# Patient Record
Sex: MALE | Race: WHITE | URBAN - METROPOLITAN AREA
[De-identification: names, ages, dates, MRNs, and addresses within clinical notes are randomized per-mention and may not be internally consistent; named-entity substitution may affect disease eponyms.]

---

## 2017-03-15 ENCOUNTER — OFFICE VISIT (OUTPATIENT)
Dept: FAMILY MEDICINE CLINIC | Age: 32
End: 2017-03-15

## 2017-03-15 VITALS
TEMPERATURE: 98.2 F | BODY MASS INDEX: 43.93 KG/M2 | WEIGHT: 315 LBS | DIASTOLIC BLOOD PRESSURE: 59 MMHG | OXYGEN SATURATION: 97 % | SYSTOLIC BLOOD PRESSURE: 114 MMHG | RESPIRATION RATE: 18 BRPM

## 2017-03-15 DIAGNOSIS — K92.1 BLOOD IN STOOL: Primary | ICD-10-CM

## 2017-03-15 DIAGNOSIS — R10.31 ABDOMINAL PAIN, RIGHT LOWER QUADRANT: ICD-10-CM

## 2017-03-15 DIAGNOSIS — R05.9 COUGH: ICD-10-CM

## 2017-03-15 DIAGNOSIS — R10.9 ABDOMINAL CRAMPING: ICD-10-CM

## 2017-03-15 RX ORDER — CIPROFLOXACIN 500 MG/1
500 TABLET ORAL 2 TIMES DAILY
Qty: 20 TAB | Refills: 0 | Status: SHIPPED | OUTPATIENT
Start: 2017-03-15 | End: 2017-03-25

## 2017-03-15 RX ORDER — METRONIDAZOLE 500 MG/1
500 TABLET ORAL 3 TIMES DAILY
Qty: 21 TAB | Refills: 0 | Status: SHIPPED | OUTPATIENT
Start: 2017-03-15 | End: 2017-03-22

## 2017-03-15 NOTE — PROGRESS NOTES
Chief Complaint   Patient presents with    Cold Symptoms     symptoms for past two weeks    Abdominal Pain     blood in stool today bright red     Ana Mac

## 2017-03-15 NOTE — MR AVS SNAPSHOT
Visit Information Date & Time Provider Department Dept. Phone Encounter #  
 3/15/2017  1:00 PM José Miguel Nobles, [facility] [place] [address] [phone] [id number] Follow-up Instructions Return if symptoms worsen or fail to improve. Upcoming Health Maintenance Date Due DTaP/Tdap/Td series (2 - Td) 12/12/2026 Allergies as of 3/15/2017  Review Complete On: 3/15/2017 By: José Miguel Nobles NP No Known Allergies Current Immunizations  Reviewed on 12/12/2016 Name Date Influenza Nasal Vaccine 12/19/2012 Influenza Vaccine (Quad) PF 10/17/2016 Tdap 12/12/2016 Not reviewed this visit You Were Diagnosed With   
  
 Codes Comments Blood in stool    -  Primary ICD-10-CM: K92.1 ICD-9-CM: 578.1 Cough     ICD-10-CM: R05 ICD-9-CM: 786.2 Abdominal cramping     ICD-10-CM: R10.9 ICD-9-CM: 789.00 Abdominal pain, right lower quadrant     ICD-10-CM: R10.31 ICD-9-CM: 789.03 Vitals BP Temp Resp Weight(growth percentile) SpO2 BMI  
 114/59 98.2 °F (36.8 °C) 18 315 lb (142.9 kg) 97% 43.93 kg/m2 Smoking Status Never Smoker BMI and BSA Data Body Mass Index Body Surface Area  
 43.93 kg/m 2 2.68 m 2 Preferred Pharmacy Pharmacy Name Phone Addison Turner [facility] [phone] Your Updated Medication List  
  
   
This list is accurate as of: 3/15/17  2:21 PM.  Always use your most recent med list.  
  
  
  
  
 albuterol 90 mcg/actuation inhaler Commonly known as:  PROVENTIL HFA, VENTOLIN HFA, PROAIR HFA Take 2 puffs by inhalation every four (4) hours as needed for Wheezing. ciprofloxacin HCl 500 mg tablet Commonly known as:  CIPRO Take 1 Tab by mouth two (2) times a day for 10 days. lisinopril-hydroCHLOROthiazide 10-12.5 mg per tablet Commonly known as:  Vinicio Posey Take 1 Tab by mouth daily. metroNIDAZOLE 500 mg tablet Commonly known as:  FLAGYL Take 1 Tab by mouth three (3) times daily for 7 days. Prescriptions Sent to Pharmacy Refills  
 ciprofloxacin HCl (CIPRO) 500 mg tablet 0 Sig: Take 1 Tab by mouth two (2) times a day for 10 days. Class: Normal  
 Pharmacy: [address] [facility] Ph #: [phone] Route: Oral  
 metroNIDAZOLE (FLAGYL) 500 mg tablet 0 Sig: Take 1 Tab by mouth three (3) times daily for 7 days. Class: Normal  
 Pharmacy: [address] [facility] Ph #: [phone] Route: Oral  
  
We Performed the Following CBC WITH AUTOMATED DIFF [32405 CPT(R)] METABOLIC PANEL, COMPREHENSIVE [79547 CPT(R)] REFERRAL TO GASTROENTEROLOGY [PUV28 Custom] Comments:  
 Please evaluate patient for abdominal pain/ + blood in stool. Follow-up Instructions Return if symptoms worsen or fail to improve. Referral Information Referral ID Referred By Referred To  
  
 [id number] [facility] [address] [place] [address] Visits Status Start Date End Date 1 New Request 3/15/17 3/15/18 If your referral has a status of pending review or denied, additional information will be sent to support the outcome of this decision. Patient Instructions Lower Gastrointestinal Bleeding: Care Instructions Your Care Instructions The digestive or gastrointestinal tract goes from the mouth to the anus. It is often called the GI tract. Bleeding in the lower GI tract can happen anywhere in your small or large intestine. It can also happen in your rectum or anus. In some cases, it is caused by an infection, cancer, or inflammatory bowel disease. Or it may be caused by hemorrhoids, diverticulitis, or clotting problems. Light bleeding may not cause any symptoms at first. But if you continue to bleed for a while, you may feel very weak or tired. Sudden, heavy bleeding means you need to see a doctor right away. This kind of bleeding can be very dangerous. But it can usually be cured or controlled. The doctor may do some tests to find the cause of your bleeding. Follow-up care is a key part of your treatment and safety. Be sure to make and go to all appointments, and call your doctor if you are having problems. It's also a good idea to know your test results and keep a list of the medicines you take. How can you care for yourself at home? · Be safe with medicines. Take your medicines exactly as prescribed. Call your doctor if you think you are having a problem with your medicine. You will get more details on the specific medicines your doctor prescribes. · Do not take aspirin or other anti-inflammatory medicines, such as naproxen (Aleve) or ibuprofen (Advil, Motrin), without talking to your doctor first. Ask your doctor if it is okay to use acetaminophen (Tylenol). · Do not drink alcohol. · The bleeding may make you lose iron. So it's important to eat foods that have a lot of iron. These include red meat, shellfish, poultry, and eggs. They also include beans, raisins, whole-grain breads, and leafy green vegetables. If you want help planning meals, you can meet with a dietitian. When should you call for help? Call 911 anytime you think you may need emergency care. For example, call if: 
· You have sudden, severe belly pain. · You vomit blood or what looks like coffee grounds. · You passed out (lost consciousness). · Your stools are maroon or very bloody. Call your doctor now or seek immediate medical care if: 
· You are dizzy or lightheaded, or you feel like you may faint. · Your stools are black and look like tar, or they have streaks of blood. · You have belly pain. · You vomit or have nausea. Watch closely for changes in your health, and be sure to contact your doctor if you do not get better as expected. Where can you learn more? Go to http://summer-lita.info/. Enter V930 in the search box to learn more about \"Lower Gastrointestinal Bleeding: Care Instructions. \" Current as of: May 27, 2016 Content Version: 11.1 © 2561-1809 Raiseworks. Care instructions adapted under license by Attend[employer] (which disclaims liability or warranty for this information). If you have questions about a medical condition or this instruction, always ask your healthcare professional. [place] [address] any warranty or liability for your use of this information. Abdominal Pain: Care Instructions Your Care Instructions Abdominal pain has many possible causes. Some aren't serious and get better on their own in a few days. Others need more testing and treatment. If your pain continues or gets worse, you need to be rechecked and may need more tests to find out what is wrong. You may need surgery to correct the problem. Don't ignore new symptoms, such as fever, nausea and vomiting, urination problems, pain that gets worse, and dizziness. These may be signs of a more serious problem. Your doctor may have recommended a follow-up visit in the next 8 to 12 hours. If you are not getting better, you may need more tests or treatment. The doctor has checked you carefully, but problems can develop later. If you notice any problems or new symptoms, get medical treatment right away. Follow-up care is a key part of your treatment and safety. Be sure to make and go to all appointments, and call your doctor if you are having problems. It's also a good idea to know your test results and keep a list of the medicines you take. How can you care for yourself at home? · Rest until you feel better.  
· To prevent dehydration, drink plenty of fluids, enough so that your urine is light yellow or clear like water. Choose water and other caffeine-free clear liquids until you feel better. If you have kidney, heart, or liver disease and have to limit fluids, talk with your doctor before you increase the amount of fluids you drink. · If your stomach is upset, eat mild foods, such as rice, dry toast or crackers, bananas, and applesauce. Try eating several small meals instead of two or three large ones. · Wait until 48 hours after all symptoms have gone away before you have spicy foods, alcohol, and drinks that contain caffeine. · Do not eat foods that are high in fat. · Avoid anti-inflammatory medicines such as aspirin, ibuprofen (Advil, Motrin), and naproxen (Aleve). These can cause stomach upset. Talk to your doctor if you take daily aspirin for another health problem. When should you call for help? Call 911 anytime you think you may need emergency care. For example, call if: 
· You passed out (lost consciousness). · You pass maroon or very bloody stools. · You vomit blood or what looks like coffee grounds. · You have new, severe belly pain. Call your doctor now or seek immediate medical care if: 
· Your pain gets worse, especially if it becomes focused in one area of your belly. · You have a new or higher fever. · Your stools are black and look like tar, or they have streaks of blood. · You have unexpected vaginal bleeding. · You have symptoms of a urinary tract infection. These may include: 
¨ Pain when you urinate. ¨ Urinating more often than usual. 
¨ Blood in your urine. · You are dizzy or lightheaded, or you feel like you may faint. Watch closely for changes in your health, and be sure to contact your doctor if: 
· You are not getting better after 1 day (24 hours). Where can you learn more? Go to http://summer-lita.info/. Enter U670 in the search box to learn more about \"Abdominal Pain: Care Instructions. \" Current as of: May 27, 2016 Content Version: 11.1 © 8854-3632 [employer]. Care instructions adapted under license by Invite[employer] (which disclaims liability or warranty for this information). If you have questions about a medical condition or this instruction, always ask your healthcare professional. [address] any warranty or liability for your use of this information. Possible Appendicitis: Care Instructions Your Care Instructions Your doctor thinks you may have appendicitis. This means that your appendix may be infected. The appendix is a small sac that is shaped like a finger. It's attached to your large intestine. Sometimes it's hard to tell if a person has appendicitis. It is especially hard to tell in children, pregnant women, and older adults. If your doctor thinks it's possible that you have appendicitis, he or she may want to order more tests. Or your doctor may want to wait to see if your symptoms change. Your doctor thinks it's okay for you to go home right now. But you will need to watch for symptoms of appendicitis at home. If your symptoms continue or get worse, it's important to call your doctor or get medical care right away. Appendicitis can get serious very quickly. The main treatment is surgery to remove your appendix. Follow-up care is a key part of your treatment and safety. Be sure to make and go to all appointments, and call your doctor if you are having problems. It's also a good idea to know your test results and keep a list of the medicines you take. How can you care for yourself at home? · Do not eat or drink, unless your doctor says it is okay. If you need surgery, it's best to have an empty stomach. If you're thirsty, you can rinse your mouth with water. Or you can suck on hard candy. · Do not take laxatives. If you have appendicitis, they can make the appendix burst. 
· Follow your doctor's instructions about taking medicines.  Your doctor may tell you not to take antibiotics or pain pills. These medicines can make it harder to tell if you have appendicitis. · Watch for symptoms of appendicitis. See the When should you call for help section below. It is very important to follow your doctor's instructions about getting treatment if you have these symptoms. When should you call for help? Call 911 anytime you think you may need emergency care. For example, call if: 
· You passed out (lost consciousness). · You have new, severe belly pain and feel weak. Call your doctor now or seek immediate medical care if: 
· You have belly pain below your belly button on the right side of your belly. · You have belly pain that gets worse when you move, walk, or cough. · Your belly pain does not get better after a few days. · You have a fever over 100°F. 
· You are sick to your stomach or can't keep fluids down. · You have trouble passing gas or stools. · Your belly is bloated or swollen. Watch closely for changes in your health, and be sure to contact your doctor if: 
· You do not get better as expected. Where can you learn more? Go to http://summer-lita.info/. Enter I354 in the search box to learn more about \"Possible Appendicitis: Care Instructions. \" Current as of: August 9, 2016 Content Version: 11.1 © 3253-9740 GemShare. Care instructions adapted under license by Philo (which disclaims liability or warranty for this information). If you have questions about a medical condition or this instruction, always ask your healthcare professional. [place] [address] any warranty or liability for your use of this information. Diverticulitis: Care Instructions Your Care Instructions Diverticulitis occurs when pouches form in the wall of the colon and become inflamed or infected. It can be very painful. Doctors aren't sure what causes diverticulitis.  There is no proof that foods such as nuts, seeds, or berries cause it or make it worse. A low-fiber diet may cause the colon to work harder to push stool forward. Pouches may form because of this extra work. It may be hard to think about healthy eating while you're in pain. But as you recover, you might think about how you can use healthy eating for overall better health. Healthy eating may help you avoid future attacks. Follow-up care is a key part of your treatment and safety. Be sure to make and go to all appointments, and call your doctor if you are having problems. It's also a good idea to know your test results and keep a list of the medicines you take. How can you care for yourself at home? · Drink plenty of fluids, enough so that your urine is light yellow or clear like water. If you have kidney, heart, or liver disease and have to limit fluids, talk with your doctor before you increase the amount of fluids you drink. · Stick to liquids or a bland diet (plain rice, bananas, dry toast or crackers, applesauce) until you are feeling better. Then you can return to regular foods and gradually increase the amount of fiber in your diet. · Use a heating pad set on low on your belly to relieve mild cramps and pain. · Get extra rest until you are feeling better. · Be safe with medicines. Read and follow all instructions on the label. ¨ If the doctor gave you a prescription medicine for pain, take it as prescribed. ¨ If you are not taking a prescription pain medicine, ask your doctor if you can take an over-the-counter medicine. · If your doctor prescribed antibiotics, take them as directed. Do not stop taking them just because you feel better. You need to take the full course of antibiotics. To prevent future attacks of diverticulitis · Avoid constipation: 
¨ Include fruits, vegetables, beans, and whole grains in your diet each day. These foods are high in fiber. ¨ Drink plenty of fluids, enough so that your urine is light yellow or clear like water. If you have kidney, heart, or liver disease and have to limit fluids, talk with your doctor before you increase the amount of fluids you drink. ¨ Get some exercise every day. Build up slowly to 30 to 60 minutes a day on 5 or more days of the week. ¨ Take a fiber supplement, such as Citrucel or Metamucil, every day if needed. Read and follow all instructions on the label. ¨ Schedule time each day for a bowel movement. Having a daily routine may help. Take your time and do not strain when having a bowel movement. When should you call for help? Call 911 anytime you think you may need emergency care. For example, call if: 
· You passed out (lost consciousness). · You vomit blood or what looks like coffee grounds. · You pass maroon or very bloody stools. Call your doctor now or seek immediate medical care if: 
· You have severe pain or swelling in your belly. · You have a new or higher fever. · You cannot keep down fluids or medicines. · You have new pain that gets worse when you move or cough. Watch closely for changes in your health, and be sure to contact your doctor if: · The symptoms you had when you first started feeling sick come back. · You do not get better as expected. Where can you learn more? Go to http://summer-lita.info/. Enter H901 in the search box to learn more about \"Diverticulitis: Care Instructions. \" Current as of: August 9, 2016 Content Version: 11.1 © 6982-5364 Healthwise, Incorporated. Care instructions adapted under license by AssuraMed (which disclaims liability or warranty for this information). If you have questions about a medical condition or this instruction, always ask your healthcare professional. [place] [address] any warranty or liability for your use of this information. Introducing [facility]! [employer] introduces Healcerion patient portal. Now you can access parts of your medical record, email your doctor's office, and request medication refills online. 1. In your internet browser, go to [email or web address] 2. Click on the First Time User? Click Here link in the Sign In box. You will see the New Member Sign Up page. 3. Enter your Healcerion Access Code exactly as it appears below. You will not need to use this code after youve completed the sign-up process. If you do not sign up before the expiration date, you must request a new code. · Healcerion Access Code: [email or web address] Expires: 6/13/2017  1:19 PM 
 
4. Enter the last four digits of your Social Security Number (xxxx) and Date of Birth (mm/dd/yyyy) as indicated and click Submit. You will be taken to the next sign-up page. 5. Create a Healcerion ID. This will be your Healcerion login ID and cannot be changed, so think of one that is secure and easy to remember. 6. Create a Healcerion password. You can change your password at any time. 7. Enter your Password Reset Question and Answer. This can be used at a later time if you forget your password. 8. Enter your e-mail address. You will receive e-mail notification when new information is available in [address] 9. Click Sign Up. You can now view and download portions of your medical record. 10. Click the Download Summary menu link to download a portable copy of your medical information. If you have questions, please visit the Frequently Asked Questions section of the Healcerion website. Remember, Healcerion is NOT to be used for urgent needs. For medical emergencies, dial 911. Now available from your iPhone and Android! Please provide this summary of care documentation to your next provider. Your primary care clinician is listed as MARY HERNANDEZ. If you have any questions after today's visit, please call [phone].

## 2017-03-15 NOTE — LETTER
NOTIFICATION RETURN TO WORK / SCHOOL 
 
3/15/2017 2:20 PM 
 
Mr. Mateo Ibrahim [address] [place] [id number] To Whom It May Concern: 
 
Mateo Ibrahim is currently under the care of [address] [place] [address]. He will return to work/school on: Monday 20, 2017 If there are questions or concerns please have the patient contact our office. Sincerely, Erin Norris NP

## 2017-03-15 NOTE — PATIENT INSTRUCTIONS
Lower Gastrointestinal Bleeding: Care Instructions  Your Care Instructions    The digestive or gastrointestinal tract goes from the mouth to the anus. It is often called the GI tract. Bleeding in the lower GI tract can happen anywhere in your small or large intestine. It can also happen in your rectum or anus. In some cases, it is caused by an infection, cancer, or inflammatory bowel disease. Or it may be caused by hemorrhoids, diverticulitis, or clotting problems. Light bleeding may not cause any symptoms at first. But if you continue to bleed for a while, you may feel very weak or tired. Sudden, heavy bleeding means you need to see a doctor right away. This kind of bleeding can be very dangerous. But it can usually be cured or controlled. The doctor may do some tests to find the cause of your bleeding. Follow-up care is a key part of your treatment and safety. Be sure to make and go to all appointments, and call your doctor if you are having problems. It's also a good idea to know your test results and keep a list of the medicines you take. How can you care for yourself at home? · Be safe with medicines. Take your medicines exactly as prescribed. Call your doctor if you think you are having a problem with your medicine. You will get more details on the specific medicines your doctor prescribes. · Do not take aspirin or other anti-inflammatory medicines, such as naproxen (Aleve) or ibuprofen (Advil, Motrin), without talking to your doctor first. Ask your doctor if it is okay to use acetaminophen (Tylenol). · Do not drink alcohol. · The bleeding may make you lose iron. So it's important to eat foods that have a lot of iron. These include red meat, shellfish, poultry, and eggs. They also include beans, raisins, whole-grain breads, and leafy green vegetables. If you want help planning meals, you can meet with a dietitian. When should you call for help?   Call 911 anytime you think you may need emergency care. For example, call if:  · You have sudden, severe belly pain. · You vomit blood or what looks like coffee grounds. · You passed out (lost consciousness). · Your stools are maroon or very bloody. Call your doctor now or seek immediate medical care if:  · You are dizzy or lightheaded, or you feel like you may faint. · Your stools are black and look like tar, or they have streaks of blood. · You have belly pain. · You vomit or have nausea. Watch closely for changes in your health, and be sure to contact your doctor if you do not get better as expected. Where can you learn more? Go to http://summer-lita.info/. Enter C504 in the search box to learn more about \"Lower Gastrointestinal Bleeding: Care Instructions. \"  Current as of: May 27, 2016  Content Version: 11.1  © 9598-0712 [employer]. Care instructions adapted under license by [employer] (which disclaims liability or warranty for this information). If you have questions about a medical condition or this instruction, always ask your healthcare professional. [place] [address] any warranty or liability for your use of this information. Abdominal Pain: Care Instructions  Your Care Instructions    Abdominal pain has many possible causes. Some aren't serious and get better on their own in a few days. Others need more testing and treatment. If your pain continues or gets worse, you need to be rechecked and may need more tests to find out what is wrong. You may need surgery to correct the problem. Don't ignore new symptoms, such as fever, nausea and vomiting, urination problems, pain that gets worse, and dizziness. These may be signs of a more serious problem. Your doctor may have recommended a follow-up visit in the next 8 to 12 hours. If you are not getting better, you may need more tests or treatment. The doctor has checked you carefully, but problems can develop later.  If you notice any problems or new symptoms, get medical treatment right away. Follow-up care is a key part of your treatment and safety. Be sure to make and go to all appointments, and call your doctor if you are having problems. It's also a good idea to know your test results and keep a list of the medicines you take. How can you care for yourself at home? · Rest until you feel better. · To prevent dehydration, drink plenty of fluids, enough so that your urine is light yellow or clear like water. Choose water and other caffeine-free clear liquids until you feel better. If you have kidney, heart, or liver disease and have to limit fluids, talk with your doctor before you increase the amount of fluids you drink. · If your stomach is upset, eat mild foods, such as rice, dry toast or crackers, bananas, and applesauce. Try eating several small meals instead of two or three large ones. · Wait until 48 hours after all symptoms have gone away before you have spicy foods, alcohol, and drinks that contain caffeine. · Do not eat foods that are high in fat. · Avoid anti-inflammatory medicines such as aspirin, ibuprofen (Advil, Motrin), and naproxen (Aleve). These can cause stomach upset. Talk to your doctor if you take daily aspirin for another health problem. When should you call for help? Call 911 anytime you think you may need emergency care. For example, call if:  · You passed out (lost consciousness). · You pass maroon or very bloody stools. · You vomit blood or what looks like coffee grounds. · You have new, severe belly pain. Call your doctor now or seek immediate medical care if:  · Your pain gets worse, especially if it becomes focused in one area of your belly. · You have a new or higher fever. · Your stools are black and look like tar, or they have streaks of blood. · You have unexpected vaginal bleeding. · You have symptoms of a urinary tract infection. These may include:  ¨ Pain when you urinate.   ¨ Urinating more often than usual.  ¨ Blood in your urine. · You are dizzy or lightheaded, or you feel like you may faint. Watch closely for changes in your health, and be sure to contact your doctor if:  · You are not getting better after 1 day (24 hours). Where can you learn more? Go to http://summer-lita.info/. Enter X789 in the search box to learn more about \"Abdominal Pain: Care Instructions. \"  Current as of: May 27, 2016  Content Version: 11.1  © 3308-4516 [employer]. Care instructions adapted under license by [employer] (which disclaims liability or warranty for this information). If you have questions about a medical condition or this instruction, always ask your healthcare professional. [address] any warranty or liability for your use of this information. Possible Appendicitis: Care Instructions  Your Care Instructions    Your doctor thinks you may have appendicitis. This means that your appendix may be infected. The appendix is a small sac that is shaped like a finger. It's attached to your large intestine. Sometimes it's hard to tell if a person has appendicitis. It is especially hard to tell in children, pregnant women, and older adults. If your doctor thinks it's possible that you have appendicitis, he or she may want to order more tests. Or your doctor may want to wait to see if your symptoms change. Your doctor thinks it's okay for you to go home right now. But you will need to watch for symptoms of appendicitis at home. If your symptoms continue or get worse, it's important to call your doctor or get medical care right away. Appendicitis can get serious very quickly. The main treatment is surgery to remove your appendix. Follow-up care is a key part of your treatment and safety. Be sure to make and go to all appointments, and call your doctor if you are having problems.  It's also a good idea to know your test results and keep a list of the medicines you take. How can you care for yourself at home? · Do not eat or drink, unless your doctor says it is okay. If you need surgery, it's best to have an empty stomach. If you're thirsty, you can rinse your mouth with water. Or you can suck on hard candy. · Do not take laxatives. If you have appendicitis, they can make the appendix burst.  · Follow your doctor's instructions about taking medicines. Your doctor may tell you not to take antibiotics or pain pills. These medicines can make it harder to tell if you have appendicitis. · Watch for symptoms of appendicitis. See the When should you call for help section below. It is very important to follow your doctor's instructions about getting treatment if you have these symptoms. When should you call for help? Call 911 anytime you think you may need emergency care. For example, call if:  · You passed out (lost consciousness). · You have new, severe belly pain and feel weak. Call your doctor now or seek immediate medical care if:  · You have belly pain below your belly button on the right side of your belly. · You have belly pain that gets worse when you move, walk, or cough. · Your belly pain does not get better after a few days. · You have a fever over 100°F.  · You are sick to your stomach or can't keep fluids down. · You have trouble passing gas or stools. · Your belly is bloated or swollen. Watch closely for changes in your health, and be sure to contact your doctor if:  · You do not get better as expected. Where can you learn more? Go to http://summer-lita.info/. Enter Q289 in the search box to learn more about \"Possible Appendicitis: Care Instructions. \"  Current as of: August 9, 2016  Content Version: 11.1  © 0305-0851 [employer]. Care instructions adapted under license by [employer] (which disclaims liability or warranty for this information).  If you have questions about a medical condition or this instruction, always ask your healthcare professional. [address] any warranty or liability for your use of this information. Diverticulitis: Care Instructions  Your Care Instructions    Diverticulitis occurs when pouches form in the wall of the colon and become inflamed or infected. It can be very painful. Doctors aren't sure what causes diverticulitis. There is no proof that foods such as nuts, seeds, or berries cause it or make it worse. A low-fiber diet may cause the colon to work harder to push stool forward. Pouches may form because of this extra work. It may be hard to think about healthy eating while you're in pain. But as you recover, you might think about how you can use healthy eating for overall better health. Healthy eating may help you avoid future attacks. Follow-up care is a key part of your treatment and safety. Be sure to make and go to all appointments, and call your doctor if you are having problems. It's also a good idea to know your test results and keep a list of the medicines you take. How can you care for yourself at home? · Drink plenty of fluids, enough so that your urine is light yellow or clear like water. If you have kidney, heart, or liver disease and have to limit fluids, talk with your doctor before you increase the amount of fluids you drink. · Stick to liquids or a bland diet (plain rice, bananas, dry toast or crackers, applesauce) until you are feeling better. Then you can return to regular foods and gradually increase the amount of fiber in your diet. · Use a heating pad set on low on your belly to relieve mild cramps and pain. · Get extra rest until you are feeling better. · Be safe with medicines. Read and follow all instructions on the label. ¨ If the doctor gave you a prescription medicine for pain, take it as prescribed.   ¨ If you are not taking a prescription pain medicine, ask your doctor if you can take an over-the-counter medicine. · If your doctor prescribed antibiotics, take them as directed. Do not stop taking them just because you feel better. You need to take the full course of antibiotics. To prevent future attacks of diverticulitis  · Avoid constipation:  ¨ Include fruits, vegetables, beans, and whole grains in your diet each day. These foods are high in fiber. ¨ Drink plenty of fluids, enough so that your urine is light yellow or clear like water. If you have kidney, heart, or liver disease and have to limit fluids, talk with your doctor before you increase the amount of fluids you drink. ¨ Get some exercise every day. Build up slowly to 30 to 60 minutes a day on 5 or more days of the week. ¨ Take a fiber supplement, such as Citrucel or Metamucil, every day if needed. Read and follow all instructions on the label. ¨ Schedule time each day for a bowel movement. Having a daily routine may help. Take your time and do not strain when having a bowel movement. When should you call for help? Call 911 anytime you think you may need emergency care. For example, call if:  · You passed out (lost consciousness). · You vomit blood or what looks like coffee grounds. · You pass maroon or very bloody stools. Call your doctor now or seek immediate medical care if:  · You have severe pain or swelling in your belly. · You have a new or higher fever. · You cannot keep down fluids or medicines. · You have new pain that gets worse when you move or cough. Watch closely for changes in your health, and be sure to contact your doctor if:  · The symptoms you had when you first started feeling sick come back. · You do not get better as expected. Where can you learn more? Go to http://summer-lita.info/. Enter H901 in the search box to learn more about \"Diverticulitis: Care Instructions. \"  Current as of: August 9, 2016  Content Version: 11.1  © 7738-2581 [employer].  Care instructions adapted under license by [employer] (which disclaims liability or warranty for this information). If you have questions about a medical condition or this instruction, always ask your healthcare professional. [address] any warranty or liability for your use of this information.

## 2017-03-15 NOTE — PROGRESS NOTES
Subjective:     Chief Complaint   Patient presents with    Cold Symptoms     symptoms for past two weeks    Abdominal Pain     blood in stool today bc Deal is a 27 y.o. male who complains of congestion, post nasal drip, productive cough, myalgias, bilateral sinus pain, chills (and felt feverish but did not take temperature) and green nasal discharge for about 2 weeks, not really improving. He denies a history of shortness of breath and wheezing. Denies fever, chest pain, dizziness. Tried OTC cold remedies (Nyquil, Dayquil and Robitussin) with temporary relief. Patient does not smoke cigarettes. Then in middle of last night, he woke up with abdominal cramping and had a BM that was normal. At 0700 this morning he had another episode of abdominal cramping and had BM that was \"soft with spots of red blood in it\" and then around 0830 he had 3rd episode of soft stool \"with just a little bit of blood in it\". Denies history of rectal bleeding in the past, denies history of hemorrhoids, he does note some \"burning\" when he has a BM. Denies eating anything abnormal yesterday or recently. He continues to have some intermittent abdominal pain, mostly in the right side of his abdomen. Denies urinary symptoms. ROS is otherwise negative. No evaluation to date. No recent travel. Sick Contacts? Not known    FLU VACCINE? Chart reviewed: immunizations are up to date and documented. Past Medical History:   Diagnosis Date    Hypertension      Social History   Substance Use Topics    Smoking status: Never Smoker    Smokeless tobacco: Never Used    Alcohol use 0.0 oz/week     0 Standard drinks or equivalent per week      Comment: rarely     Current Outpatient Prescriptions on File Prior to Visit   Medication Sig Dispense Refill    lisinopril-hydrochlorothiazide (PRINZIDE, ZESTORETIC) 10-12.5 mg per tablet Take 1 Tab by mouth daily.  30 Tab 5    albuterol (PROVENTIL HFA, VENTOLIN HFA, PROAIR HFA) 90 mcg/actuation inhaler Take 2 puffs by inhalation every four (4) hours as needed for Wheezing. 1 Inhaler 0     No current facility-administered medications on file prior to visit. No Known Allergies     Review of Systems  Pertinent items are noted in HPI. Agree with nurses note. OBJECTIVE:   Visit Vitals    /59    Temp 98.2 °F (36.8 °C)    Resp 18    Wt 315 lb (142.9 kg)    SpO2 97%    BMI 43.93 kg/m2     He appears well, vital signs are as noted above   PAIN: No complaints of pain today. HEAD:  Normocephalic. Atraumatic. Non tender sinuses x 4. EYE: PERRLA. EOMs intact. Sclera anicteric without injection. No drainage or discharge. EARS: Hearing intact bilaterally. External ear canals normal without evidence of blood or swelling. Bilateral TM's intact, pearly grey with landmarks visible. No erythema or effusion. NOSE: Patent. Nasal turbinates pink. No polyps noted. No erythema. No discharge. MOUTH: mucous membranes pink and moist. Posterior pharynx normal with cobblestone appearance. No erythema, white exudate or obstruction. NECK: supple. Midline trachea. RESP: Breath sounds are symmetrical bilaterally. Unlabored without SOB. Speaking in full sentences. Clear to auscultation bilaterally anteriorly and posteriorly. No wheezes. No rales or rhonchi. Non-productive cough when elicited. CV: normal rate. Regular rhythm. S1, S2 audible. No murmur noted. No rubs, clicks or gallops noted. ABD: obese but soft non distended abdomen, +bowel sounds, some tenderness to palpation in right lower quadrant, no rebound pain and negative Psoas sign, non rigid   HEME/LYMPH: peripheral pulses palpable 2+ x 4 extremities. No peripheral edema is noted. No cervical adenopathy noted. SKIN: clean dry and intact throughout. no rashes, erythema, ecchymosis, lacerations, abrasions, suspicious moles noted  Rectal exam:  + blood per FIT test in office.   No obvious or jackeline blood on exam.  No hemorrhoids or masses noted. Assessment/Plan:   Differential diagnosis and treatment options reviewed with patient who is in agreement with treatment plan as outlined below. ICD-10-CM ICD-9-CM    1. Blood in stool K92.1 578.1 ciprofloxacin HCl (CIPRO) 500 mg tablet      metroNIDAZOLE (FLAGYL) 500 mg tablet      CBC WITH AUTOMATED DIFF      METABOLIC PANEL, COMPREHENSIVE      REFERRAL TO GASTROENTEROLOGY   2. Cough R05 786.2    3. Abdominal cramping R10.9 789.00 ciprofloxacin HCl (CIPRO) 500 mg tablet      metroNIDAZOLE (FLAGYL) 500 mg tablet      REFERRAL TO GASTROENTEROLOGY   4. Abdominal pain, right lower quadrant R10.31 789.03 ciprofloxacin HCl (CIPRO) 500 mg tablet      metroNIDAZOLE (FLAGYL) 500 mg tablet      CBC WITH AUTOMATED DIFF      METABOLIC PANEL, COMPREHENSIVE      REFERRAL TO GASTROENTEROLOGY     Differential diagnosis include:  Colitis, diverticulitis, appendicitis, gastroenteritis. Will treat for potential diverticulitis with antibiotics, medication profiles discussed with patient  Will get labs today and have him follow up in office on Friday. He is advised that if he starts vomiting, has fever or worsening of pain/ increased bleeding then he will need to go to ER immediately. Advised that he should be on clear liquid diet until pain subsides and then advance to tolerated slowly with bland foods. Gastro referral given. Advised to call and make appointment, should follow up and may need to be sooner. If he needs assistance making appointment he will let us know on Friday. Alternate Ibuprofen with Tylenol every 4 hours as needed for pain and discomfort. OTC nasal saline spray  2-3 sprays per nostril twice a day to clear eustachian tube and assist with post nasal drip symptoms. OTC antihistamines (Zyrtec or Claritin)  to reduce allergic symptoms such as sneezing, runny nose and itchy eyes. OTC Mucinex for 3-5 days for congestion. Increase water intake. Verbal and written instructions (see AVS) provided. Patient expresses understanding and agreement of diagnosis and treatment plan.

## 2017-03-16 LAB
ALBUMIN SERPL-MCNC: 4.5 G/DL (ref 3.5–5.5)
ALBUMIN/GLOB SERPL: 1.5 {RATIO} (ref 1.2–2.2)
ALP SERPL-CCNC: 90 IU/L (ref 39–117)
ALT SERPL-CCNC: 38 IU/L (ref 0–44)
AST SERPL-CCNC: 26 IU/L (ref 0–40)
BASOPHILS # BLD AUTO: 0 X10E3/UL (ref 0–0.2)
BASOPHILS NFR BLD AUTO: 0 %
BILIRUB SERPL-MCNC: 0.5 MG/DL (ref 0–1.2)
BUN SERPL-MCNC: 19 MG/DL (ref 6–20)
BUN/CREAT SERPL: 22 (ref 8–19)
CALCIUM SERPL-MCNC: 9.6 MG/DL (ref 8.7–10.2)
CHLORIDE SERPL-SCNC: 93 MMOL/L (ref 96–106)
CO2 SERPL-SCNC: 27 MMOL/L (ref 18–29)
CREAT SERPL-MCNC: 0.88 MG/DL (ref 0.76–1.27)
EOSINOPHIL # BLD AUTO: 0.2 X10E3/UL (ref 0–0.4)
EOSINOPHIL NFR BLD AUTO: 3 %
ERYTHROCYTE [DISTWIDTH] IN BLOOD BY AUTOMATED COUNT: 13.6 % (ref 12.3–15.4)
GLOBULIN SER CALC-MCNC: 3 G/DL (ref 1.5–4.5)
GLUCOSE SERPL-MCNC: 146 MG/DL (ref 65–99)
HCT VFR BLD AUTO: 49.9 % (ref 37.5–51)
HGB BLD-MCNC: 17 G/DL (ref 12.6–17.7)
IMM GRANULOCYTES # BLD: 0 X10E3/UL (ref 0–0.1)
IMM GRANULOCYTES NFR BLD: 0 %
LYMPHOCYTES # BLD AUTO: 1.5 X10E3/UL (ref 0.7–3.1)
LYMPHOCYTES NFR BLD AUTO: 19 %
MCH RBC QN AUTO: 27.4 PG (ref 26.6–33)
MCHC RBC AUTO-ENTMCNC: 34.1 G/DL (ref 31.5–35.7)
MCV RBC AUTO: 81 FL (ref 79–97)
MONOCYTES # BLD AUTO: 0.9 X10E3/UL (ref 0.1–0.9)
MONOCYTES NFR BLD AUTO: 11 %
NEUTROPHILS # BLD AUTO: 5.3 X10E3/UL (ref 1.4–7)
NEUTROPHILS NFR BLD AUTO: 67 %
PLATELET # BLD AUTO: 209 X10E3/UL (ref 150–379)
POTASSIUM SERPL-SCNC: 4.4 MMOL/L (ref 3.5–5.2)
PROT SERPL-MCNC: 7.5 G/DL (ref 6–8.5)
RBC # BLD AUTO: 6.2 X10E6/UL (ref 4.14–5.8)
SODIUM SERPL-SCNC: 136 MMOL/L (ref 134–144)
WBC # BLD AUTO: 7.8 X10E3/UL (ref 3.4–10.8)

## 2017-03-17 ENCOUNTER — OFFICE VISIT (OUTPATIENT)
Dept: FAMILY MEDICINE CLINIC | Age: 32
End: 2017-03-17

## 2017-03-17 VITALS
OXYGEN SATURATION: 94 % | RESPIRATION RATE: 18 BRPM | TEMPERATURE: 97.9 F | WEIGHT: 315 LBS | SYSTOLIC BLOOD PRESSURE: 120 MMHG | BODY MASS INDEX: 44.1 KG/M2 | HEART RATE: 93 BPM | DIASTOLIC BLOOD PRESSURE: 77 MMHG | HEIGHT: 71 IN

## 2017-03-17 DIAGNOSIS — R10.9 ABDOMINAL CRAMPING: Primary | ICD-10-CM

## 2017-03-17 DIAGNOSIS — K92.1 BLOOD IN STOOL: ICD-10-CM

## 2017-03-17 NOTE — PROGRESS NOTES
CHICHO Pacheco is a 27 y.o. male who is to follow-up on blood work and abdominal pain. Was seen 2 days ago for crampy abdominal pain that woke him up at 1 AM, he had a normal bowel movement and went back to sleep. At 7 AM he had another episode of right lower quadrant abdominal cramping and had a bowel movement that was soft with spots of blood in it. Then around 830 he had a third episode of soft stool with a little blood in it. He did not have any more abdominal cramping that day and by the time he came in for his appointment felt fine. A bedside fit test was positive for blood and negative for external hemorrhoids. Had a bowel movement later in the day after that appointment that had some small spots of blood in it. Started taking Cipro and Flagyl on the day of his appointment. Yesterday midday he had a normal bowel movement. Has not had any cramping today, has not had a bowel movement today. Tolerating antibiotics well. Has a URI    PMHx:  Past Medical History:   Diagnosis Date    Hypertension        Meds:   Current Outpatient Prescriptions   Medication Sig Dispense Refill    ciprofloxacin HCl (CIPRO) 500 mg tablet Take 1 Tab by mouth two (2) times a day for 10 days. 20 Tab 0    metroNIDAZOLE (FLAGYL) 500 mg tablet Take 1 Tab by mouth three (3) times daily for 7 days. 21 Tab 0    lisinopril-hydrochlorothiazide (PRINZIDE, ZESTORETIC) 10-12.5 mg per tablet Take 1 Tab by mouth daily. 30 Tab 5    albuterol (PROVENTIL HFA, VENTOLIN HFA, PROAIR HFA) 90 mcg/actuation inhaler Take 2 puffs by inhalation every four (4) hours as needed for Wheezing.  1 Inhaler 0       Allergies:   No Known Allergies    Smoker:  History   Smoking Status    Never Smoker   Smokeless Tobacco    Never Used       ETOH:   History   Alcohol Use    0.0 oz/week    0 Standard drinks or equivalent per week     Comment: rarely       FH:   Family History   Problem Relation Age of Onset    Hypertension Father        ROS:  As listed in HPI. In addition:  Constitutional:   No headache, fever, fatigue, weight loss or weight gain      Eyes:   No redness, pruritis, pain, visual changes, swelling, or discharge      Ears:    No pain, loss or changes in hearing     Cardiac:    No chest pain      Resp:   No cough or shortness of breath      Neuro   No loss of consciousness, dizziness, seizures      Physical Exam:  Blood pressure 120/77, pulse 93, temperature 97.9 °F (36.6 °C), resp. rate 18, height 5' 11\" (1.803 m), weight 315 lb (142.9 kg), SpO2 94 %. GEN: No apparent distress. Alert and oriented and responds to all questions appropriately. NOSE: Turbinates are congested with drainage  ABDOMEN: Soft; nontender; nondistended; normoactive bowel sounds; no masses or organomegaly  NEUROLOGIC:  No focal neurologic deficits. Strength and sensation grossly intact. Coordination and gait grossly intact. EXT: Well perfused. No edema. SKIN: No obvious rashes. Assessment and Plan   Reviewed labs with patient, nothing significant    Abdominal cramping, blood in stool  Appears to have resolved. Differential diagnosis is diverticulitis versus internal hemorrhoid. Of note the abdominal cramping seems to have preceded an upper respiratory infection (related?)    He is tolerating the antibiotics well. No apparent blood in stool. He is a little young for diverticulosis. I favor internal hemorrhoid as the diagnosis. If this is a one-time event no further workup required. If it happens again may benefit from a CT scan    Follow-up as needed      ICD-10-CM ICD-9-CM    1. Abdominal cramping R10.9 789.00    2. Blood in stool K92.1 578.1        AVS given.  Pt expressed understanding of instructions

## 2017-03-17 NOTE — LETTER
3/17/2017 3:20 PM 
 
Mr. Marisela Palmer [address] [place] [id number] Dear Marisela Palmer: Please find your most recent results below. Resulted Orders CBC WITH AUTOMATED DIFF Result Value Ref Range WBC 7.8 3.4 - 10.8 x10E3/uL  
 RBC 6.20 (H) 4.14 - 5.80 x10E6/uL HGB 17.0 12.6 - 17.7 g/dL HCT 49.9 37.5 - 51.0 % MCV 81 79 - 97 fL  
 MCH 27.4 26.6 - 33.0 pg  
 MCHC 34.1 31.5 - 35.7 g/dL  
 RDW 13.6 12.3 - 15.4 % PLATELET 289 928 - 211 x10E3/uL NEUTROPHILS 67 % Lymphocytes 19 % MONOCYTES 11 % EOSINOPHILS 3 % BASOPHILS 0 %  
 ABS. NEUTROPHILS 5.3 1.4 - 7.0 x10E3/uL Abs Lymphocytes 1.5 0.7 - 3.1 x10E3/uL  
 ABS. MONOCYTES 0.9 0.1 - 0.9 x10E3/uL  
 ABS. EOSINOPHILS 0.2 0.0 - 0.4 x10E3/uL  
 ABS. BASOPHILS 0.0 0.0 - 0.2 x10E3/uL IMMATURE GRANULOCYTES 0 %  
 ABS. IMM. GRANS. 0.0 0.0 - 0.1 x10E3/uL Narrative Performed at:  [address]  [id number] : Yony Hicks MD, Phone:  [phone] METABOLIC PANEL, COMPREHENSIVE Result Value Ref Range Glucose 146 (H) 65 - 99 mg/dL BUN 19 6 - 20 mg/dL Creatinine 0.88 0.76 - 1.27 mg/dL GFR est non- >59 mL/min/1.73 GFR est  >59 mL/min/1.73  
 BUN/Creatinine ratio 22 (H) 8 - 19 Sodium 136 134 - 144 mmol/L Potassium 4.4 3.5 - 5.2 mmol/L Chloride 93 (L) 96 - 106 mmol/L  
 CO2 27 18 - 29 mmol/L Calcium 9.6 8.7 - 10.2 mg/dL Protein, total 7.5 6.0 - 8.5 g/dL Albumin 4.5 3.5 - 5.5 g/dL GLOBULIN, TOTAL 3.0 1.5 - 4.5 g/dL A-G Ratio 1.5 1.2 - 2.2 Comment: **Please note reference interval change** Bilirubin, total 0.5 0.0 - 1.2 mg/dL Alk. phosphatase 90 39 - 117 IU/L  
 AST (SGOT) 26 0 - 40 IU/L  
 ALT (SGPT) 38 0 - 44 IU/L Narrative Performed at:  [address]  [id number] : Yony Hicks MD, Phone:  [phone]

## 2017-03-17 NOTE — PROGRESS NOTES
.  Chief Complaint   Patient presents with   [facility] [place]     . Chandni Castaneda

## 2017-03-28 DIAGNOSIS — I10 ESSENTIAL HYPERTENSION WITH GOAL BLOOD PRESSURE LESS THAN 140/90: ICD-10-CM

## 2017-03-28 RX ORDER — LISINOPRIL AND HYDROCHLOROTHIAZIDE 10; 12.5 MG/1; MG/1
1 TABLET ORAL DAILY
Qty: 90 TAB | Refills: 3 | Status: SHIPPED | OUTPATIENT
Start: 2017-03-28 | End: 2018-05-04 | Stop reason: SDUPTHER

## 2018-05-04 ENCOUNTER — OFFICE VISIT (OUTPATIENT)
Dept: FAMILY MEDICINE CLINIC | Age: 33
End: 2018-05-04

## 2018-05-04 VITALS
HEART RATE: 78 BPM | DIASTOLIC BLOOD PRESSURE: 72 MMHG | BODY MASS INDEX: 41.58 KG/M2 | RESPIRATION RATE: 14 BRPM | SYSTOLIC BLOOD PRESSURE: 132 MMHG | OXYGEN SATURATION: 95 % | WEIGHT: 297 LBS | HEIGHT: 71 IN | TEMPERATURE: 97.8 F

## 2018-05-04 DIAGNOSIS — K64.9 BLEEDING HEMORRHOID: ICD-10-CM

## 2018-05-04 DIAGNOSIS — I10 ESSENTIAL HYPERTENSION: ICD-10-CM

## 2018-05-04 DIAGNOSIS — R73.03 PREDIABETES: ICD-10-CM

## 2018-05-04 DIAGNOSIS — Z00.00 WELL ADULT EXAM: Primary | ICD-10-CM

## 2018-05-04 DIAGNOSIS — I10 ESSENTIAL HYPERTENSION WITH GOAL BLOOD PRESSURE LESS THAN 140/90: ICD-10-CM

## 2018-05-04 RX ORDER — LISINOPRIL AND HYDROCHLOROTHIAZIDE 10; 12.5 MG/1; MG/1
1 TABLET ORAL DAILY
Qty: 90 TAB | Refills: 3 | Status: SHIPPED | OUTPATIENT
Start: 2018-05-04 | End: 2019-10-21

## 2018-05-04 NOTE — PATIENT INSTRUCTIONS
Learning About Dietary Guidelines  What are the Dietary Guidelines for Americans? Dietary Guidelines for Americans provide tips for eating well and staying healthy. This helps reduce the risk for long-term (chronic) diseases. These adult guidelines from the [place] recommend that you:  · Eat lots of fruits, vegetables, whole grains, and low-fat or nonfat dairy products. · Try to balance your eating with your activity. This helps you stay at a healthy weight. · Drink alcohol in moderation, if at all. · Limit foods high in salt, saturated fat, trans fat, and added sugar. What is MyPlate? MyPlate is the U.S. government's food guide. It can help you make your own well-balanced eating plan. A balanced eating plan means that you eat enough, but not too much, and that your food gives you the nutrients you need to stay healthy. MyPlate focuses on eating plenty of whole grains, fruits, and vegetables, and on limiting fat and sugar. It is available online at www. ChooseMyPlate.gov. How can you get started? MyPlate suggests that most adults eat certain amounts from the different food groups:  Grains  Eat 5 to 8 ounces of grains each day. Half of those should be whole grains. Choose whole-grain breads, cold and cooked cereals and grains, pasta (without creamy sauces), hard rolls, or low-fat or fat-free crackers. Vegetables  Eat 2 to 3 cups of vegetables every day. They contain little if any fat. And they have lots of nutrients that help protect against heart disease. Fruits  Eat 1½ to 2 cups of fruits every day. Fruits contain very little fat but lots of nutrients. Protein foods  Most adults need 5 to 6½ ounces each day. Choose fish and lean poultry more often. Eat red meat and fried meats less often. Dried beans, tofu, and nuts are also good sources of protein. Dairy  Most adults need 3 cups of milk and milk products a day. Choose low-fat or fat-free products from this food group.  If you have problems digesting milk, try eating cheese or yogurt instead. Limit fats and oils, including those used in cooking. When you do use fats, choose oils that are liquid at room temperature (unsaturated fats). These include canola oil and olive oil. Avoid foods with trans fats, such as many fried foods, cookies, and snack foods. Where can you learn more? Go to http://summer-lita.info/. Enter L776 in the search box to learn more about \"Learning About Dietary Guidelines. \"  Current as of: May 12, 2017  Content Version: 11.4  © 5886-6503 [employer]. Care instructions adapted under license by [employer] (which disclaims liability or warranty for this information). If you have questions about a medical condition or this instruction, always ask your healthcare professional. [address] any warranty or liability for your use of this information. Hemorrhoids: Care Instructions  Your Care Instructions    Hemorrhoids are enlarged veins that develop in the anal canal. Bleeding during bowel movements, itching, swelling, and rectal pain are the most common symptoms. They can be uncomfortable at times, but hemorrhoids rarely are a serious problem. You can treat most hemorrhoids with simple changes to your diet and bowel habits. These changes include eating more fiber and not straining to pass stools. Most hemorrhoids do not need surgery or other treatment unless they are very large and painful or bleed a lot. Follow-up care is a key part of your treatment and safety. Be sure to make and go to all appointments, and call your doctor if you are having problems. It's also a good idea to know your test results and keep a list of the medicines you take. How can you care for yourself at home? · Sit in a few inches of warm water (sitz bath) 3 times a day and after bowel movements. The warm water helps with pain and itching.   · Put ice on your anal area several times a day for 10 minutes at a time. Put a thin cloth between the ice and your skin. Follow this by placing a warm, wet towel on the area for another 10 to 20 minutes. · Take pain medicines exactly as directed. ¨ If the doctor gave you a prescription medicine for pain, take it as prescribed. ¨ If you are not taking a prescription pain medicine, ask your doctor if you can take an over-the-counter medicine. · Keep the anal area clean, but be gentle. Use water and a fragrance-free soap, such as [place], or use baby wipes or medicated pads, such as Tucks. · Wear cotton underwear and loose clothing to decrease moisture in the anal area. · Eat more fiber. Include foods such as whole-grain breads and cereals, raw vegetables, raw and dried fruits, and beans. · Drink plenty of fluids, enough so that your urine is light yellow or clear like water. If you have kidney, heart, or liver disease and have to limit fluids, talk with your doctor before you increase the amount of fluids you drink. · Use a stool softener that contains bran or psyllium. You can save money by buying bran or psyllium (available in bulk at most health food stores) and sprinkling it on foods or stirring it into fruit juice. Or you can use a product such as Metamucil or Hydrocil. · Practice healthy bowel habits. ¨ Go to the bathroom as soon as you have the urge. ¨ Avoid straining to pass stools. Relax and give yourself time to let things happen naturally. ¨ Do not hold your breath while passing stools. ¨ Do not read while sitting on the toilet. Get off the toilet as soon as you have finished. · Take your medicines exactly as prescribed. Call your doctor if you think you are having a problem with your medicine. When should you call for help? Call 911 anytime you think you may need emergency care. For example, call if:  ? · You pass maroon or very bloody stools.    ?Call your doctor now or seek immediate medical care if:  ? · You have increased pain. ? · You have increased bleeding. ? Watch closely for changes in your health, and be sure to contact your doctor if:  ? · Your symptoms have not improved after 3 or 4 days. Where can you learn more? Go to http://summer-lita.info/. Enter F228 in the search box to learn more about \"Hemorrhoids: Care Instructions. \"  Current as of: May 12, 2017  Content Version: 11.4  © 6196-5387 [employer]. Care instructions adapted under license by [employer] (which disclaims liability or warranty for this information). If you have questions about a medical condition or this instruction, always ask your healthcare professional. [place] [address] any warranty or liability for your use of this information.

## 2018-05-04 NOTE — PROGRESS NOTES
HPI  William Arndt is a 34 y.o. male who presents for checkup and get his blood pressure checked. He has been doing well for the last year. Has been taking blood pressure medication for years. He has lost about 15 pounds since we saw him last because he is getting more exercise. Has not changed his diet significantly. He does eat a lot of potatoes. Does not drink soda but does drink sweet tea. He has also stopped going out to eat quite as much work. Eating a few more home cooked meals and focusing on grilling. He had slight constipation the last couple of days and had some blood on toilet paper when wiping. He would like to get this checked. There was some question of diverticulitis this time last year with an episode of abdominal pain and loose stool but halungs clear to auscultation bilaterally  CV regular rhythm no murmur s not had any problems since        PMHx:  Past Medical History:   Diagnosis Date    Hypertension        Meds:   Current Outpatient Prescriptions   Medication Sig Dispense Refill    lisinopril-hydroCHLOROthiazide (PRINZIDE, ZESTORETIC) 10-12.5 mg per tablet Take 1 Tab by mouth daily. 90 Tab 3       Allergies:   No Known Allergies    Smoker:  History   Smoking Status    Never Smoker   Smokeless Tobacco    Never Used       ETOH:   History   Alcohol Use    0.0 oz/week    0 Standard drinks or equivalent per week     Comment: rarely       FH:   Family History   Problem Relation Age of Onset    Hypertension Father        ROS:   As listed in HPI. In addition:  Constitutional:   No headache, fever, fatigue, weight loss or weight gain      Cardiac:    No chest pain      Resp:   No cough or shortness of breath      Neuro   No loss of consciousness, dizziness, seizures      Physical Exam:  Blood pressure 132/72, pulse 78, temperature 97.8 °F (36.6 °C), resp. rate 14, height 5' 11\" (1.803 m), weight 297 lb (134.7 kg), SpO2 95 %. GEN: No apparent distress.  Alert and oriented and responds to all questions appropriately. NEUROLOGIC:  No focal neurologic deficits. Strength and sensation grossly intact. Coordination and gait grossly intact. EXT: Well perfused. No edema. SKIN: No obvious rashes. HEENT unremarkable. rectal exam.  There is a deflated and slightly red external hemorrhoid in the 7 o'clock position. Because of body habitus prostate is nonpalpable       Assessment and Plan     Well adult  Surveillance labs including a TSH for his BMI  A1c because of his BMI    Hypertension  He does snore but does not have excessive daytime sleepiness  Blood pressure medication appears to be working well    Obese  Counseled extensively on diet and exercise today. Particular diet. Provided with healthy plate options    Hemorrhoid, currently deflated. Recommended high-fiber diet among other conservative interventions    New diagnosis prediabetes, A1c 6.1six-month follow-up      ICD-10-CM ICD-9-CM    1. Well adult exam Z00.00 V70.0 TSH 3RD GENERATION      HEMOGLOBIN A1C WITH EAG   2. Essential hypertension with goal blood pressure less than 140/90 I10 401.9 lisinopril-hydroCHLOROthiazide (PRINZIDE, ZESTORETIC) 10-12.5 mg per tablet   3. Essential hypertension E26 390.5 METABOLIC PANEL, COMPREHENSIVE      CBC WITH AUTOMATED DIFF      LIPID PANEL   4. Bleeding hemorrhoid K64.9 455.8    5. BMI 40.0-44.9, adult ([facility] [address] Z68.41 V85.41    6. Prediabetes R73.03 790.29        AVS given.  Pt expressed understanding of instructions

## 2018-05-04 NOTE — MR AVS SNAPSHOT
[facility] 
 
 
 [address] 
[phone] Patient: Deysi Barraza MRN: [id number] Visit Information Date & Time Provider Department Dept. Phone Encounter #  
 5/4/2018  9:00 AM Justin Reyes MD [address] [facility] [phone] [id number] Upcoming Health Maintenance Date Due Influenza Age 5 to Adult 8/1/2018 DTaP/Tdap/Td series (2 - Td) 12/12/2026 Allergies as of 5/4/2018  Review Complete On: 3/15/2017 By: Addi Yuen NP No Known Allergies Current Immunizations  Reviewed on 12/12/2016 Name Date Influenza Nasal Vaccine 12/19/2012 Influenza Vaccine (Quad) PF 10/17/2016 Tdap 12/12/2016 Not reviewed this visit You Were Diagnosed With   
  
 Codes Comments Well adult exam    -  Primary ICD-10-CM: Z00.00 ICD-9-CM: V70.0 Essential hypertension with goal blood pressure less than 140/90     ICD-10-CM: I10 
ICD-9-CM: 401.9 Essential hypertension     ICD-10-CM: I10 
ICD-9-CM: 401.9 Bleeding hemorrhoid     ICD-10-CM: K64.9 ICD-9-CM: 455.8 BMI 40.0-44.9, adult [facility])     ICD-10-CM: Z68.41 
ICD-9-CM: V85.41 Vitals BP Pulse Temp Resp Height(growth percentile) Weight(growth percentile) 132/72 (BP 1 Location: Left arm, BP Patient Position: Sitting) 78 97.8 °F (36.6 °C) 14 5' 11\" (1.803 m) 297 lb (134.7 kg) SpO2 BMI Smoking Status 95% 41.42 kg/m2 Never Smoker BMI and BSA Data Body Mass Index Body Surface Area  
 41.42 kg/m 2 2.6 m 2 Preferred Pharmacy Pharmacy Name Phone [facility] [address] [facility] [phone] Your Updated Medication List  
  
   
This list is accurate as of 5/4/18  9:32 AM.  Always use your most recent med list.  
  
  
  
  
 albuterol 90 mcg/actuation inhaler Commonly known as:  PROVENTIL HFA, VENTOLIN HFA, PROAIR HFA  
 Take 2 puffs by inhalation every four (4) hours as needed for Wheezing. lisinopril-hydroCHLOROthiazide 10-12.5 mg per tablet Commonly known as:  Rahel Sanchez Take 1 Tab by mouth daily. Prescriptions Sent to Pharmacy Refills  
 lisinopril-hydroCHLOROthiazide (PRINZIDE, ZESTORETIC) 10-12.5 mg per tablet 3 Sig: Take 1 Tab by mouth daily. Class: Normal  
 Pharmacy: [place] [id number], [address] [place] [address] #: [phone] Route: Oral  
  
We Performed the Following CBC WITH AUTOMATED DIFF [45415 CPT(R)] HEMOGLOBIN A1C WITH EAG [19132 CPT(R)] LIPID PANEL [39413 CPT(R)] METABOLIC PANEL, COMPREHENSIVE [61600 CPT(R)] TSH 3RD GENERATION [75153 CPT(R)] Patient Instructions Learning About Dietary Guidelines What are the Dietary Guidelines for Americans? Dietary Guidelines for Americans provide tips for eating well and staying healthy. This helps reduce the risk for long-term (chronic) diseases. These adult guidelines from the [place] recommend that you: 
· Eat lots of fruits, vegetables, whole grains, and low-fat or nonfat dairy products. · Try to balance your eating with your activity. This helps you stay at a healthy weight. · Drink alcohol in moderation, if at all. · Limit foods high in salt, saturated fat, trans fat, and added sugar. What is MyPlate? MyPlate is the U.S. government's food guide. It can help you make your own well-balanced eating plan. A balanced eating plan means that you eat enough, but not too much, and that your food gives you the nutrients you need to stay healthy. MyPlate focuses on eating plenty of whole grains, fruits, and vegetables, and on limiting fat and sugar. It is available online at www. ChooseMyPlate.gov. How can you get started? MyPlate suggests that most adults eat certain amounts from the different food groups: 
Grains Eat 5 to 8 ounces of grains each day. Half of those should be whole grains. Choose whole-grain breads, cold and cooked cereals and grains, pasta (without creamy sauces), hard rolls, or low-fat or fat-free crackers. Vegetables Eat 2 to 3 cups of vegetables every day. They contain little if any fat. And they have lots of nutrients that help protect against heart disease. Fruits Eat 1½ to 2 cups of fruits every day. Fruits contain very little fat but lots of nutrients. Protein foods Most adults need 5 to 6½ ounces each day. Choose fish and lean poultry more often. Eat red meat and fried meats less often. Dried beans, tofu, and nuts are also good sources of protein. Dairy Most adults need 3 cups of milk and milk products a day. Choose low-fat or fat-free products from this food group. If you have problems digesting milk, try eating cheese or yogurt instead. Limit fats and oils, including those used in cooking. When you do use fats, choose oils that are liquid at room temperature (unsaturated fats). These include canola oil and olive oil. Avoid foods with trans fats, such as many fried foods, cookies, and snack foods. Where can you learn more? Go to http://summer-lita.info/. Enter E476 in the search box to learn more about \"Learning About Dietary Guidelines. \" Current as of: May 12, 2017 Content Version: 11.4 © 0259-0774 [employer]. Care instructions adapted under license by [employer] (which disclaims liability or warranty for this information). If you have questions about a medical condition or this instruction, always ask your healthcare professional. [place] [address] any warranty or liability for your use of this information. Hemorrhoids: Care Instructions Your Care Instructions Hemorrhoids are enlarged veins that develop in the anal canal. Bleeding during bowel movements, itching, swelling, and rectal pain are the most common symptoms. They can be uncomfortable at times, but hemorrhoids rarely are a serious problem. You can treat most hemorrhoids with simple changes to your diet and bowel habits. These changes include eating more fiber and not straining to pass stools. Most hemorrhoids do not need surgery or other treatment unless they are very large and painful or bleed a lot. Follow-up care is a key part of your treatment and safety. Be sure to make and go to all appointments, and call your doctor if you are having problems. It's also a good idea to know your test results and keep a list of the medicines you take. How can you care for yourself at home? · Sit in a few inches of warm water (sitz bath) 3 times a day and after bowel movements. The warm water helps with pain and itching. · Put ice on your anal area several times a day for 10 minutes at a time. Put a thin cloth between the ice and your skin. Follow this by placing a warm, wet towel on the area for another 10 to 20 minutes. · Take pain medicines exactly as directed. ¨ If the doctor gave you a prescription medicine for pain, take it as prescribed. ¨ If you are not taking a prescription pain medicine, ask your doctor if you can take an over-the-counter medicine. · Keep the anal area clean, but be gentle. Use water and a fragrance-free soap, such as [place], or use baby wipes or medicated pads, such as Tucks. · Wear cotton underwear and loose clothing to decrease moisture in the anal area. · Eat more fiber. Include foods such as whole-grain breads and cereals, raw vegetables, raw and dried fruits, and beans. · Drink plenty of fluids, enough so that your urine is light yellow or clear like water. If you have kidney, heart, or liver disease and have to limit fluids, talk with your doctor before you increase the amount of fluids you drink. · Use a stool softener that contains bran or psyllium.  You can save money by buying bran or psyllium (available in bulk at most health food stores) and sprinkling it on foods or stirring it into fruit juice. Or you can use a product such as Metamucil or Hydrocil. · Practice healthy bowel habits. ¨ Go to the bathroom as soon as you have the urge. ¨ Avoid straining to pass stools. Relax and give yourself time to let things happen naturally. ¨ Do not hold your breath while passing stools. ¨ Do not read while sitting on the toilet. Get off the toilet as soon as you have finished. · Take your medicines exactly as prescribed. Call your doctor if you think you are having a problem with your medicine. When should you call for help? Call 911 anytime you think you may need emergency care. For example, call if: 
? · You pass maroon or very bloody stools. ?Call your doctor now or seek immediate medical care if: 
? · You have increased pain. ? · You have increased bleeding. ? Watch closely for changes in your health, and be sure to contact your doctor if: 
? · Your symptoms have not improved after 3 or 4 days. Where can you learn more? Go to http://summer-lita.info/. Enter F228 in the search box to learn more about \"Hemorrhoids: Care Instructions. \" Current as of: May 12, 2017 Content Version: 11.4 © 0869-4904 [employer]. Care instructions adapted under license by [employer] (which disclaims liability or warranty for this information). If you have questions about a medical condition or this instruction, always ask your healthcare professional. [place] [address] any warranty or liability for your use of this information. Introducing [facility]! Mahin Thao introduces SDI patient portal. Now you can access parts of your medical record, email your doctor's office, and request medication refills online. 1. In your internet browser, go to [email or web address] 2. Click on the First Time User? Click Here link in the Sign In box. You will see the New Member Sign Up page. 3. Enter your DNA Response Access Code exactly as it appears below. You will not need to use this code after youve completed the sign-up process. If you do not sign up before the expiration date, you must request a new code. · DNA Response Access Code: [email or web address] Expires: 8/2/2018  8:56 AM 
 
4. Enter the last four digits of your Social Security Number (xxxx) and Date of Birth (mm/dd/yyyy) as indicated and click Submit. You will be taken to the next sign-up page. 5. Create a DNA Response ID. This will be your DNA Response login ID and cannot be changed, so think of one that is secure and easy to remember. 6. Create a DNA Response password. You can change your password at any time. 7. Enter your Password Reset Question and Answer. This can be used at a later time if you forget your password. 8. Enter your e-mail address. You will receive e-mail notification when new information is available in [address] 9. Click Sign Up. You can now view and download portions of your medical record. 10. Click the Download Summary menu link to download a portable copy of your medical information. If you have questions, please visit the Frequently Asked Questions section of the DNA Response website. Remember, DNA Response is NOT to be used for urgent needs. For medical emergencies, dial 911. Now available from your iPhone and Android! Please provide this summary of care documentation to your next provider. Your primary care clinician is listed as MARY HERNANDEZ. If you have any questions after today's visit, please call [phone].

## 2018-05-05 LAB
ALBUMIN SERPL-MCNC: 4 G/DL (ref 3.5–5.5)
ALBUMIN/GLOB SERPL: 1.2 {RATIO} (ref 1.2–2.2)
ALP SERPL-CCNC: 83 IU/L (ref 39–117)
ALT SERPL-CCNC: 31 IU/L (ref 0–44)
AST SERPL-CCNC: 18 IU/L (ref 0–40)
BASOPHILS # BLD AUTO: 0 X10E3/UL (ref 0–0.2)
BASOPHILS NFR BLD AUTO: 1 %
BILIRUB SERPL-MCNC: 0.9 MG/DL (ref 0–1.2)
BUN SERPL-MCNC: 19 MG/DL (ref 6–20)
BUN/CREAT SERPL: 18 (ref 9–20)
CALCIUM SERPL-MCNC: 9.5 MG/DL (ref 8.7–10.2)
CHLORIDE SERPL-SCNC: 98 MMOL/L (ref 96–106)
CHOLEST SERPL-MCNC: 175 MG/DL (ref 100–199)
CO2 SERPL-SCNC: 26 MMOL/L (ref 18–29)
CREAT SERPL-MCNC: 1.05 MG/DL (ref 0.76–1.27)
EOSINOPHIL # BLD AUTO: 0.2 X10E3/UL (ref 0–0.4)
EOSINOPHIL NFR BLD AUTO: 2 %
ERYTHROCYTE [DISTWIDTH] IN BLOOD BY AUTOMATED COUNT: 13.7 % (ref 12.3–15.4)
EST. AVERAGE GLUCOSE BLD GHB EST-MCNC: 128 MG/DL
GFR SERPLBLD CREATININE-BSD FMLA CKD-EPI: 107 ML/MIN/1.73
GFR SERPLBLD CREATININE-BSD FMLA CKD-EPI: 93 ML/MIN/1.73
GLOBULIN SER CALC-MCNC: 3.3 G/DL (ref 1.5–4.5)
GLUCOSE SERPL-MCNC: 112 MG/DL (ref 65–99)
HBA1C MFR BLD: 6.1 % (ref 4.8–5.6)
HCT VFR BLD AUTO: 50.1 % (ref 37.5–51)
HDLC SERPL-MCNC: 48 MG/DL
HGB BLD-MCNC: 17.5 G/DL (ref 13–17.7)
IMM GRANULOCYTES # BLD: 0 X10E3/UL (ref 0–0.1)
IMM GRANULOCYTES NFR BLD: 0 %
INTERPRETATION, 910389: NORMAL
LDLC SERPL CALC-MCNC: 103 MG/DL (ref 0–99)
LYMPHOCYTES # BLD AUTO: 2.1 X10E3/UL (ref 0.7–3.1)
LYMPHOCYTES NFR BLD AUTO: 31 %
MCH RBC QN AUTO: 28 PG (ref 26.6–33)
MCHC RBC AUTO-ENTMCNC: 34.9 G/DL (ref 31.5–35.7)
MCV RBC AUTO: 80 FL (ref 79–97)
MONOCYTES # BLD AUTO: 0.7 X10E3/UL (ref 0.1–0.9)
MONOCYTES NFR BLD AUTO: 11 %
NEUTROPHILS # BLD AUTO: 3.9 X10E3/UL (ref 1.4–7)
NEUTROPHILS NFR BLD AUTO: 55 %
PLATELET # BLD AUTO: 201 X10E3/UL (ref 150–379)
POTASSIUM SERPL-SCNC: 4.4 MMOL/L (ref 3.5–5.2)
PROT SERPL-MCNC: 7.3 G/DL (ref 6–8.5)
RBC # BLD AUTO: 6.25 X10E6/UL (ref 4.14–5.8)
SODIUM SERPL-SCNC: 139 MMOL/L (ref 134–144)
TRIGL SERPL-MCNC: 120 MG/DL (ref 0–149)
TSH SERPL DL<=0.005 MIU/L-ACNC: 1.95 UIU/ML (ref 0.45–4.5)
VLDLC SERPL CALC-MCNC: 24 MG/DL (ref 5–40)
WBC # BLD AUTO: 7 X10E3/UL (ref 3.4–10.8)

## 2018-05-10 ENCOUNTER — TELEPHONE (OUTPATIENT)
Dept: FAMILY MEDICINE CLINIC | Age: 33
End: 2018-05-10

## 2018-05-10 PROBLEM — R73.03 PREDIABETES: Status: ACTIVE | Noted: 2018-05-10

## 2018-05-10 NOTE — TELEPHONE ENCOUNTER
Your hemoglobin A1c is high. You are \"pre-diabetic. \" You do not have diabetes but you will develop diabetes in the next few years if you do not make some changes. Change your diet, focus on vegetables and lean meats. Avoid pasta, white bread and potatoes. Do not drink soda. We can refer you to a dietician if you would like to talk with someone about your diet. Make these changes and come back in 6 months to recheck your blood sugar to make sure you are going in the right direction. Otherwise your labs look good.   Your cholesterol is okay

## 2018-05-11 NOTE — TELEPHONE ENCOUNTER
Verito Pillai notified Dr. Carloz Oneill said Your hemoglobin A1c is high. You are \"pre-diabetic. \" You do not have diabetes but you will develop diabetes in the next few years if you do not make some changes. Change your diet, focus on vegetables and lean meats. Avoid pasta, white bread and potatoes. Do not drink soda. We can refer you to a dietician if you would like to talk with someone about your diet. Make these changes and come back in 6 months to recheck your blood sugar to make sure you are going in the right direction.        Otherwise your labs look good.   Your cholesterol is okay    He voiced understanding and will work on his diet

## 2019-10-21 ENCOUNTER — OFFICE VISIT (OUTPATIENT)
Dept: FAMILY MEDICINE CLINIC | Age: 34
End: 2019-10-21

## 2019-10-21 VITALS
BODY MASS INDEX: 44.1 KG/M2 | SYSTOLIC BLOOD PRESSURE: 127 MMHG | TEMPERATURE: 98.8 F | RESPIRATION RATE: 14 BRPM | DIASTOLIC BLOOD PRESSURE: 87 MMHG | HEART RATE: 77 BPM | OXYGEN SATURATION: 95 % | HEIGHT: 71 IN | WEIGHT: 315 LBS

## 2019-10-21 DIAGNOSIS — R07.9 CHEST PAIN, UNSPECIFIED TYPE: ICD-10-CM

## 2019-10-21 DIAGNOSIS — M54.50 ACUTE RIGHT-SIDED LOW BACK PAIN WITHOUT SCIATICA: ICD-10-CM

## 2019-10-21 DIAGNOSIS — R73.9 ELEVATED BLOOD SUGAR: ICD-10-CM

## 2019-10-21 DIAGNOSIS — I10 ESSENTIAL HYPERTENSION: Primary | ICD-10-CM

## 2019-10-21 DIAGNOSIS — E66.01 OBESITY, MORBID (HCC): ICD-10-CM

## 2019-10-21 NOTE — PROGRESS NOTES
Chief Complaint   Patient presents with    LOW BACK PAIN     x1 month    Dizziness    Breathing Problem    Fatigue     Pt reports he has not been feeling well for almost a month, pt reports that he has had a low back pain that goes his lower back. Pt reports that he has been getting out of breath with activity for the past few days. Pt also reports some heart racing. Pt reports intermittent chest pain. Pt reports that he has had increased urinary frequency, even had an episode of where he wet the bed. Pt has been working 12 hour days 7 days a week. Pt is currently on no medication. Subjective: (As above and below)     Chief Complaint   Patient presents with    LOW BACK PAIN     x1 month    Dizziness    Breathing Problem    Fatigue     he is a 28y.o. year old male who presents for evaluation. Reviewed PmHx, RxHx, FmHx, SocHx, AllgHx and updated in chart. Review of Systems - negative except as listed above    Objective:     Vitals:    10/21/19 1022   BP: 127/87   Pulse: 77   Resp: 14   Temp: 98.8 °F (37.1 °C)   TempSrc: Oral   SpO2: 95%   Weight: 315 lb (142.9 kg)   Height: 5' 11\" (1.803 m)     Physical Examination: General appearance - alert, well appearing, and in no distress  Mental status - normal mood, behavior, speech, dress, motor activity, and thought processes  Mouth - mucous membranes moist, pharynx normal without lesions  Chest - clear to auscultation, no wheezes, rales or rhonchi, symmetric air entry  Heart - normal rate, regular rhythm, normal S1, S2, no murmurs, rubs, clicks or gallops  Musculoskeletal - no joint tenderness, deformity or swelling  Extremities - peripheral pulses normal, no pedal edema, no clubbing or cyanosis    Assessment/ Plan:   1. Obesity, morbid ([address]  -Continue to work on diet and exercise    2.  Essential hypertension  -Well-controlled  - CBC W/O DIFF  - TSH 3RD GENERATION  - VITAMIN D, 25 HYDROXY    3. Elevated blood sugar  -Check labs today  - METABOLIC PANEL, COMPREHENSIVE  - LIPID PANEL  - HEMOGLOBIN A1C WITH EAG  - AMB POC GLUCOSE BLOOD, BY GLUCOSE MONITORING DEVICE    4. Chest pain, unspecified type  -Reviewed with patient during appointment  - AMB POC EKG ROUTINE W/ 12 LEADS, INTER & REP    5. Acute right-sided low back pain without sciatica  -Recommended home stretching and increased rest as able  - AMB POC URINALYSIS DIP STICK AUTO W/O MICRO       I have discussed the diagnosis with the patient and the intended plan as seen in the above orders. The patient has received an after-visit summary and questions were answered concerning future plans.      Medication Side Effects and Warnings were discussed with patient: yes  Patient Labs were reviewed: yes  Patient Past Records were reviewed:  yes    Gamal Young M.D.

## 2019-10-21 NOTE — PROGRESS NOTES
Chief Complaint   Patient presents with    LOW BACK PAIN     x1 month    Dizziness    Breathing Problem    Fatigue       1. Have you been to the ER, urgent care clinic since your last visit? Hospitalized since your last visit? No    2. Have you seen or consulted any other health care providers outside of the [address] [place] [address] since your last visit? Include any pap smears or colon screening. No     There are no preventive care reminders to display for this patient.

## 2019-10-24 LAB
25(OH)D3+25(OH)D2 SERPL-MCNC: 25.2 NG/ML (ref 30–100)
ALBUMIN SERPL-MCNC: 4 G/DL (ref 3.5–5.5)
ALBUMIN/GLOB SERPL: 1.3 {RATIO} (ref 1.2–2.2)
ALP SERPL-CCNC: 89 IU/L (ref 39–117)
ALT SERPL-CCNC: 42 IU/L (ref 0–44)
AST SERPL-CCNC: 31 IU/L (ref 0–40)
BILIRUB SERPL-MCNC: 1 MG/DL (ref 0–1.2)
BUN SERPL-MCNC: 16 MG/DL (ref 6–20)
BUN/CREAT SERPL: 16 (ref 9–20)
CALCIUM SERPL-MCNC: 9 MG/DL (ref 8.7–10.2)
CHLORIDE SERPL-SCNC: 99 MMOL/L (ref 96–106)
CHOLEST SERPL-MCNC: 180 MG/DL (ref 100–199)
CO2 SERPL-SCNC: 21 MMOL/L (ref 20–29)
CREAT SERPL-MCNC: 0.97 MG/DL (ref 0.76–1.27)
ERYTHROCYTE [DISTWIDTH] IN BLOOD BY AUTOMATED COUNT: 12.7 % (ref 12.3–15.4)
EST. AVERAGE GLUCOSE BLD GHB EST-MCNC: 154 MG/DL
GLOBULIN SER CALC-MCNC: 3.2 G/DL (ref 1.5–4.5)
GLUCOSE SERPL-MCNC: 114 MG/DL (ref 65–99)
HBA1C MFR BLD: 7 % (ref 4.8–5.6)
HCT VFR BLD AUTO: 47.9 % (ref 37.5–51)
HDLC SERPL-MCNC: 46 MG/DL
HGB BLD-MCNC: 16.4 G/DL (ref 13–17.7)
INTERPRETATION, 910389: NORMAL
LDLC SERPL CALC-MCNC: 111 MG/DL (ref 0–99)
Lab: NORMAL
MCH RBC QN AUTO: 27.5 PG (ref 26.6–33)
MCHC RBC AUTO-ENTMCNC: 34.2 G/DL (ref 31.5–35.7)
MCV RBC AUTO: 80 FL (ref 79–97)
PLATELET # BLD AUTO: 201 X10E3/UL (ref 150–450)
POTASSIUM SERPL-SCNC: 4.5 MMOL/L (ref 3.5–5.2)
PROT SERPL-MCNC: 7.2 G/DL (ref 6–8.5)
RBC # BLD AUTO: 5.96 X10E6/UL (ref 4.14–5.8)
SODIUM SERPL-SCNC: 136 MMOL/L (ref 134–144)
TRIGL SERPL-MCNC: 117 MG/DL (ref 0–149)
TSH SERPL DL<=0.005 MIU/L-ACNC: 2.22 UIU/ML (ref 0.45–4.5)
VLDLC SERPL CALC-MCNC: 23 MG/DL (ref 5–40)
WBC # BLD AUTO: 6.8 X10E3/UL (ref 3.4–10.8)

## 2019-10-26 NOTE — PROGRESS NOTES
Cholesterol is elevated, please closely monitor diet and increase exercise, recheck in 6 months. Labs show that you are diabetic, recommend starting on once daily medication to control blood sugars. Vitamin D is slightly low, please take a daily supplement of at least 2000 IU (international units) daily. All other labs are within normal limits. A message has been sent in [employer] and the lab work released to the patient.

## 2021-01-19 ENCOUNTER — OFFICE VISIT (OUTPATIENT)
Dept: FAMILY MEDICINE CLINIC | Age: 36
End: 2021-01-19
Payer: COMMERCIAL

## 2021-01-19 VITALS
RESPIRATION RATE: 16 BRPM | WEIGHT: 315 LBS | TEMPERATURE: 99 F | SYSTOLIC BLOOD PRESSURE: 135 MMHG | OXYGEN SATURATION: 94 % | BODY MASS INDEX: 44.1 KG/M2 | HEIGHT: 71 IN | HEART RATE: 100 BPM | DIASTOLIC BLOOD PRESSURE: 82 MMHG

## 2021-01-19 DIAGNOSIS — E66.01 OBESITY, MORBID (HCC): ICD-10-CM

## 2021-01-19 DIAGNOSIS — E55.9 VITAMIN D DEFICIENCY: ICD-10-CM

## 2021-01-19 DIAGNOSIS — I10 ESSENTIAL HYPERTENSION: ICD-10-CM

## 2021-01-19 DIAGNOSIS — R73.03 PREDIABETES: ICD-10-CM

## 2021-01-19 DIAGNOSIS — Z00.00 WELLNESS EXAMINATION: Primary | ICD-10-CM

## 2021-01-19 DIAGNOSIS — Z13.29 THYROID DISORDER SCREENING: ICD-10-CM

## 2021-01-19 PROCEDURE — 99000 SPECIMEN HANDLING OFFICE-LAB: CPT | Performed by: NURSE PRACTITIONER

## 2021-01-19 PROCEDURE — 36415 COLL VENOUS BLD VENIPUNCTURE: CPT | Performed by: NURSE PRACTITIONER

## 2021-01-19 PROCEDURE — 99395 PREV VISIT EST AGE 18-39: CPT | Performed by: NURSE PRACTITIONER

## 2021-01-19 NOTE — PROGRESS NOTES
Subjective:     Chief Complaint   Patient presents with    Physical        HPI:  Jonathan Blanton is a 38 y.o. male here for follow up and routine physical, has not been seen since 10/21/2019. HTN  Used to be on medication but has not been on for \"a long time\". No palpitations, SOB or chest pain. Does not check his BP at home  Tries to avoid extra salt. Denies nicotine or Etoh. His last labs showed elevated HgbA1c 7.0 but never started on medication. Started watching his diet, cut soda out and decreased sweet tea consumption and drinks more water. Denies polydipsia, does think he urinates a lot. No numbness in legs or feet. He does note that his father is diabetic. No hospital, ER or specialist visits since last primary care visit except as noted above. Past Medical History:   Diagnosis Date    Hypertension      No past surgical history on file. Family History   Problem Relation Age of Onset    Hypertension Father      Social History     Socioeconomic History    Marital status:      Spouse name: Not on file    Number of children: Not on file    Years of education: Not on file    Highest education level: Not on file   Tobacco Use    Smoking status: Never Smoker    Smokeless tobacco: Never Used   Substance and Sexual Activity    Alcohol use: Yes     Alcohol/week: 0.0 standard drinks     Comment: rarely    Drug use: No         Social History     Tobacco Use    Smoking status: Never Smoker    Smokeless tobacco: Never Used   Substance Use Topics    Alcohol use: Yes     Alcohol/week: 0.0 standard drinks     Comment: rarely    Drug use: No           No Known Allergies    Health Maintenance reviewed - declines flu shot.       ROS:  Gen: no fatigue, no fever, no chills, no unexplained weight loss or weight gain  Eyes: no excessive tearing, itching, or discharge  Nose: no rhinorrhea, no sinus pain  Mouth: no oral lesions, no sore throat, no difficulty swallowing  Resp: no shortness of breath, no wheezing, no cough  CV: no chest pain, no orthopnea, no paroxysmal nocturnal dyspnea, no lower extremity edema, no palpitations  Abd: no nausea, no heartburn, no diarrhea, no constipation, no abdominal pain  Neuro: no headaches, no syncope or presyncopal episodes  Endo: no polyuria, no polydipsia. : no hematuria, no dysuria, no frequency, no incontinence  Heme: no lymphadenopathy, no easy bruising or bleeding, no night sweats  MSK: no joint pain or swelling    PE:  Visit Vitals  /82   Pulse 100   Temp 99 °F (37.2 °C) (Oral)   Resp 16   Ht 5' 11\" (1.803 m)   Wt 315 lb (142.9 kg)   SpO2 94%   BMI 43.93 kg/m²     Gen: alert, oriented, no acute distress  Head: normocephalic, atraumatic  Ears: external auditory canals clear, TMs without erythema or effusion  Eyes: pupils equal round reactive to light, sclera clear, conjunctiva clear  Oral: moist mucus membranes, no oral lesions, no pharyngeal inflammation or exudate  Neck: symmetric normal sized thyroid, no carotid bruits, no jugular vein distention  Resp: no increase work of breathing, lungs clear to ausculation bilaterally, no wheezing, rales or rhonchi  CV: S1, S2 normal.  No murmurs, rubs, or gallops. Abd: soft, not tender, not distended. No hepatosplenomegaly. Normal bowel sounds. No hernias. No abdominal or renal bruits. Neuro: cranial nerves intact, normal strength and movement in all extremities, and sensation intact and symmetric. Skin: no lesion or rash  Extremities: no cyanosis or edema    No results found for this visit on 01/19/21. Assessment/Plan:  Differential diagnosis and treatment options reviewed with patient who is in agreement with treatment plan as outlined below. ICD-10-CM ICD-9-CM    1. Wellness examination  Z00.00 V70.0    2.  Prediabetes  R73.03 790.29 LIPID PANEL      METABOLIC PANEL, COMPREHENSIVE      HEMOGLOBIN A1C WITH EAG      [facility] HAND[facility]&/OR CONVEY OF SPEC FOR TR OFFICE TO LAB COLLECTION VENOUS BLOOD,VENIPUNCTURE   3. Obesity, morbid ([facility] [address]  E66.01 278.01 CBC WITH AUTOMATED DIFF   4. Essential hypertension  I10 401.9 CBC WITH AUTOMATED DIFF      METABOLIC PANEL, COMPREHENSIVE   5. Vitamin D deficiency  E55.9 268.9 VITAMIN D, 25 HYDROXY   6. Thyroid disorder screening  Z13.29 V77.0 TSH 3RD GENERATION     BP okay on repeat. Labs today. If HgbA1c still elevated, will start on metformin, statin, and low dose lisinopril. Discussed with patient. Will discuss more after labs. Discussed BMI and healthy weight. Encouraged patient to work to implement changes including diet high in raw fruits and vegetables, lean protein and good fats. Limit refined, processed carbohydrates and sugar. Encouraged regular exercise. Recommended regular cardiovascular exercise 3-6 times per week for 30-60 minutes daily. I have discussed the diagnosis with the patient and the intended plan as seen in the above orders. The patient has received an after-visit summary and questions were answered concerning future plans. I have discussed medication side effects and warnings with the patient as well. The patient verbalizes understanding and agreement with the plan.

## 2021-01-19 NOTE — PATIENT INSTRUCTIONS
DASH Diet: Care Instructions Your Care Instructions The DASH diet is an eating plan that can help lower your blood pressure. DASH stands for Dietary Approaches to Stop Hypertension. Hypertension is high blood pressure. The DASH diet focuses on eating foods that are high in calcium, potassium, and magnesium. These nutrients can lower blood pressure. The foods that are highest in these nutrients are fruits, vegetables, low-fat dairy products, nuts, seeds, and legumes. But taking calcium, potassium, and magnesium supplements instead of eating foods that are high in those nutrients does not have the same effect. The DASH diet also includes whole grains, fish, and poultry. The DASH diet is one of several lifestyle changes your doctor may recommend to lower your high blood pressure. Your doctor may also want you to decrease the amount of sodium in your diet. Lowering sodium while following the DASH diet can lower blood pressure even further than just the DASH diet alone. Follow-up care is a key part of your treatment and safety. Be sure to make and go to all appointments, and call your doctor if you are having problems. It's also a good idea to know your test results and keep a list of the medicines you take. How can you care for yourself at home? Following the DASH diet · Eat 4 to 5 servings of fruit each day. A serving is 1 medium-sized piece of fruit, ½ cup chopped or canned fruit, 1/4 cup dried fruit, or 4 ounces (½ cup) of fruit juice. Choose fruit more often than fruit juice. · Eat 4 to 5 servings of vegetables each day. A serving is 1 cup of lettuce or raw leafy vegetables, ½ cup of chopped or cooked vegetables, or 4 ounces (½ cup) of vegetable juice. Choose vegetables more often than vegetable juice. · Get 2 to 3 servings of low-fat and fat-free dairy each day. A serving is 8 ounces of milk, 1 cup of yogurt, or 1 ½ ounces of cheese. · Eat 6 to 8 servings of grains each day. A serving is 1 slice of bread, 1 ounce of dry cereal, or ½ cup of cooked rice, pasta, or cooked cereal. Try to choose whole-grain products as much as possible. · Limit lean meat, poultry, and fish to 2 servings each day. A serving is 3 ounces, about the size of a deck of cards. · Eat 4 to 5 servings of nuts, seeds, and legumes (cooked dried beans, lentils, and split peas) each week. A serving is 1/3 cup of nuts, 2 tablespoons of seeds, or ½ cup of cooked beans or peas. · Limit fats and oils to 2 to 3 servings each day. A serving is 1 teaspoon of vegetable oil or 2 tablespoons of salad dressing. · Limit sweets and added sugars to 5 servings or less a week. A serving is 1 tablespoon jelly or jam, ½ cup sorbet, or 1 cup of lemonade. · Eat less than 2,300 milligrams (mg) of sodium a day. If you limit your sodium to 1,500 mg a day, you can lower your blood pressure even more. Tips for success · Start small. Do not try to make dramatic changes to your diet all at once. You might feel that you are missing out on your favorite foods and then be more likely to not follow the plan. Make small changes, and stick with them. Once those changes become habit, add a few more changes. · Try some of the following: ? Make it a goal to eat a fruit or vegetable at every meal and at snacks. This will make it easy to get the recommended amount of fruits and vegetables each day. ? Try yogurt topped with fruit and nuts for a snack or healthy dessert. ? Add lettuce, tomato, cucumber, and onion to sandwiches. ? Combine a ready-made pizza crust with low-fat mozzarella cheese and lots of vegetable toppings. Try using tomatoes, squash, spinach, broccoli, carrots, cauliflower, and onions. ? Have a variety of cut-up vegetables with a low-fat dip as an appetizer instead of chips and dip. ? Sprinkle sunflower seeds or chopped almonds over salads. Or try adding chopped walnuts or almonds to cooked vegetables. ? Try some vegetarian meals using beans and peas. Add garbanzo or kidney beans to salads. Make burritos and tacos with mashed k[place] beans or black beans. Where can you learn more? Go to http://www.gray.com/ Enter D799 in the search box to learn more about \"DASH Diet: Care Instructions. \" Current as of: December 16, 2019               Content Version: 12.6 © 4868-7803 [employer]. Care instructions adapted under license by [employer] (which disclaims liability or warranty for this information). If you have questions about a medical condition or this instruction, always ask your healthcare professional. [address] any warranty or liability for your use of this information. Learning About Diabetes Food Guidelines Your Care Instructions Meal planning is important to manage diabetes. It helps keep your blood sugar at a target level (which you set with your doctor). You don't have to eat special foods. You can eat what your family eats, including sweets once in a while. But you do have to pay attention to how often you eat and how much you eat of certain foods. You may want to work with a dietitian or a certified diabetes educator (CDE) to help you plan meals and snacks. A dietitian or CDE can also help you lose weight if that is one of your goals. What should you know about eating carbs? Managing the amount of carbohydrate (carbs) you eat is an important part of healthy meals when you have diabetes. Carbohydrate is found in many foods. · Learn which foods have carbs. And learn the amounts of carbs in different foods. ? Bread, cereal, pasta, and rice have about 15 grams of carbs in a serving. A serving is 1 slice of bread (1 ounce), ½ cup of cooked cereal, or 1/3 cup of cooked pasta or rice. ? Fruits have 15 grams of carbs in a serving. A serving is 1 small fresh fruit, such as an apple or orange; ½ of a banana; ½ cup of cooked or canned fruit; ½ cup of fruit juice; 1 cup of melon or raspberries; or 2 tablespoons of dried fruit. ? Milk and no-sugar-added yogurt have 15 grams of carbs in a serving. A serving is 1 cup of milk or 2/3 cup of no-sugar-added yogurt. ? Starchy vegetables have 15 grams of carbs in a serving. A serving is ½ cup of mashed potatoes or sweet potato; 1 cup winter squash; ½ of a small baked potato; ½ cup of cooked beans; or ½ cup cooked corn or green peas. · Learn how much carbs to eat each day and at each meal. A dietitian or CDE can teach you how to keep track of the amount of carbs you eat. This is called carbohydrate counting. · If you are not sure how to count carbohydrate grams, use the Plate Method to plan meals. It is a good, quick way to make sure that you have a balanced meal. It also helps you spread carbs throughout the day. ? Divide your plate by types of foods. Put non-starchy vegetables on half the plate, meat or other protein food on one-quarter of the plate, and a grain or starchy vegetable in the final quarter of the plate. To this you can add a small piece of fruit and 1 cup of milk or yogurt, depending on how many carbs you are supposed to eat at a meal. 
· Try to eat about the same amount of carbs at each meal. Do not \"save up\" your daily allowance of carbs to eat at one meal. 
· Proteins have very little or no carbs per serving. Examples of proteins are beef, chicken, turkey, fish, eggs, tofu, cheese, cottage cheese, and peanut butter. A serving size of meat is 3 ounces, which is about the size of a deck of cards. Examples of meat substitute serving sizes (equal to 1 ounce of meat) are 1/4 cup of cottage cheese, 1 egg, 1 tablespoon of peanut butter, and ½ cup of tofu. How can you eat out and still eat healthy? · Learn to estimate the serving sizes of foods that have carbohydrate. If you measure food at home, it will be easier to estimate the amount in a serving of restaurant food. · If the meal you order has too much carbohydrate (such as potatoes, corn, or baked beans), ask to have a low-carbohydrate food instead. Ask for a salad or green vegetables. · If you use insulin, check your blood sugar before and after eating out to help you plan how much to eat in the future. · If you eat more carbohydrate at a meal than you had planned, take a walk or do other exercise. This will help lower your blood sugar. What else should you know? · Limit saturated fat, such as the fat from meat and dairy products. This is a healthy choice because people who have diabetes are at higher risk of heart disease. So choose lean cuts of meat and nonfat or low-fat dairy products. Use olive or canola oil instead of butter or shortening when cooking. · Don't skip meals. Your blood sugar may drop too low if you skip meals and take insulin or certain medicines for diabetes. · Check with your doctor before you drink alcohol. Alcohol can cause your blood sugar to drop too low. Alcohol can also cause a bad reaction if you take certain diabetes medicines. Follow-up care is a key part of your treatment and safety. Be sure to make and go to all appointments, and call your doctor if you are having problems. It's also a good idea to know your test results and keep a list of the medicines you take. Where can you learn more? Go to http://www.gray.com/ Enter Y161 in the search box to learn more about \"Learning About Diabetes Food Guidelines. \" Current as of: December 20, 2019               Content Version: 12.6 © 1698-2992 [employer]. Care instructions adapted under license by [employer] (which disclaims liability or warranty for this information). If you have questions about a medical condition or this instruction, always ask your healthcare professional. [address] any warranty or liability for your use of this information.

## 2021-01-19 NOTE — PROGRESS NOTES
Shabnam Dockery is a 38 y.o. male  Chief Complaint   Patient presents with    Physical     Health Maintenance Due   Topic Date Due    Flu Vaccine (1) 09/01/2020    A1C test (Diabetic or Prediabetic)  10/21/2020     Visit Vitals  BP (!) 151/87 (BP 1 Location: Left arm, BP Patient Position: Sitting)   Pulse 100   Temp 99 °F (37.2 °C) (Oral)   Resp 16   Ht 5' 11\" (1.803 m)   Wt 315 lb (142.9 kg)   SpO2 94%   BMI 43.93 kg/m²     1. Have you been to the ER, urgent care clinic since your last visit? Hospitalized since your last visit? No    2. Have you seen or consulted any other health care providers outside of the [address] [place] [address] since your last visit? Include any pap smears or colon screening.  No

## 2021-01-20 LAB
25(OH)D3+25(OH)D2 SERPL-MCNC: 12.2 NG/ML (ref 30–100)
ALBUMIN SERPL-MCNC: 4 G/DL (ref 4–5)
ALBUMIN/GLOB SERPL: 1.1 {RATIO} (ref 1.2–2.2)
ALP SERPL-CCNC: 110 IU/L (ref 39–117)
ALT SERPL-CCNC: 41 IU/L (ref 0–44)
AST SERPL-CCNC: 23 IU/L (ref 0–40)
BASOPHILS # BLD AUTO: 0.1 X10E3/UL (ref 0–0.2)
BASOPHILS NFR BLD AUTO: 1 %
BILIRUB SERPL-MCNC: 0.5 MG/DL (ref 0–1.2)
BUN SERPL-MCNC: 20 MG/DL (ref 6–20)
BUN/CREAT SERPL: 21 (ref 9–20)
CALCIUM SERPL-MCNC: 9 MG/DL (ref 8.7–10.2)
CHLORIDE SERPL-SCNC: 100 MMOL/L (ref 96–106)
CHOLEST SERPL-MCNC: 203 MG/DL (ref 100–199)
CO2 SERPL-SCNC: 23 MMOL/L (ref 20–29)
CREAT SERPL-MCNC: 0.94 MG/DL (ref 0.76–1.27)
EOSINOPHIL # BLD AUTO: 0.2 X10E3/UL (ref 0–0.4)
EOSINOPHIL NFR BLD AUTO: 2 %
ERYTHROCYTE [DISTWIDTH] IN BLOOD BY AUTOMATED COUNT: 12.6 % (ref 11.6–15.4)
EST. AVERAGE GLUCOSE BLD GHB EST-MCNC: 203 MG/DL
GLOBULIN SER CALC-MCNC: 3.5 G/DL (ref 1.5–4.5)
GLUCOSE SERPL-MCNC: 218 MG/DL (ref 65–99)
HBA1C MFR BLD: 8.7 % (ref 4.8–5.6)
HCT VFR BLD AUTO: 51.1 % (ref 37.5–51)
HDLC SERPL-MCNC: 48 MG/DL
HGB BLD-MCNC: 17.2 G/DL (ref 13–17.7)
IMM GRANULOCYTES # BLD AUTO: 0 X10E3/UL (ref 0–0.1)
IMM GRANULOCYTES NFR BLD AUTO: 0 %
INTERPRETATION, 910389: NORMAL
LDLC SERPL CALC-MCNC: 109 MG/DL (ref 0–99)
LYMPHOCYTES # BLD AUTO: 1.8 X10E3/UL (ref 0.7–3.1)
LYMPHOCYTES NFR BLD AUTO: 23 %
Lab: NORMAL
MCH RBC QN AUTO: 26.7 PG (ref 26.6–33)
MCHC RBC AUTO-ENTMCNC: 33.7 G/DL (ref 31.5–35.7)
MCV RBC AUTO: 80 FL (ref 79–97)
MONOCYTES # BLD AUTO: 0.7 X10E3/UL (ref 0.1–0.9)
MONOCYTES NFR BLD AUTO: 8 %
NEUTROPHILS # BLD AUTO: 5.2 X10E3/UL (ref 1.4–7)
NEUTROPHILS NFR BLD AUTO: 66 %
PLATELET # BLD AUTO: 206 X10E3/UL (ref 150–450)
POTASSIUM SERPL-SCNC: 4.5 MMOL/L (ref 3.5–5.2)
PROT SERPL-MCNC: 7.5 G/DL (ref 6–8.5)
RBC # BLD AUTO: 6.43 X10E6/UL (ref 4.14–5.8)
SODIUM SERPL-SCNC: 137 MMOL/L (ref 134–144)
TRIGL SERPL-MCNC: 268 MG/DL (ref 0–149)
TSH SERPL DL<=0.005 MIU/L-ACNC: 1.82 UIU/ML (ref 0.45–4.5)
VLDLC SERPL CALC-MCNC: 46 MG/DL (ref 5–40)
WBC # BLD AUTO: 7.8 X10E3/UL (ref 3.4–10.8)

## 2021-01-22 DIAGNOSIS — E78.2 MIXED HYPERLIPIDEMIA: ICD-10-CM

## 2021-01-22 DIAGNOSIS — E55.9 VITAMIN D DEFICIENCY: ICD-10-CM

## 2021-01-22 DIAGNOSIS — E11.9 NEW ONSET TYPE 2 DIABETES MELLITUS (HCC): Primary | ICD-10-CM

## 2021-01-22 RX ORDER — ERGOCALCIFEROL 1.25 MG/1
50000 CAPSULE ORAL
Qty: 12 CAP | Refills: 0 | Status: SHIPPED | OUTPATIENT
Start: 2021-01-22 | End: 2021-04-13 | Stop reason: SDUPTHER

## 2021-01-22 RX ORDER — METFORMIN HYDROCHLORIDE 750 MG/1
750 TABLET, EXTENDED RELEASE ORAL 2 TIMES DAILY
Qty: 180 TAB | Refills: 0 | Status: SHIPPED | OUTPATIENT
Start: 2021-01-22 | End: 2021-06-01 | Stop reason: SDUPTHER

## 2021-01-22 RX ORDER — LANCETS
EACH MISCELLANEOUS
Qty: 100 EACH | Refills: 3 | Status: SHIPPED | OUTPATIENT
Start: 2021-01-22

## 2021-01-22 RX ORDER — PRAVASTATIN SODIUM 10 MG/1
10 TABLET ORAL
Qty: 90 TAB | Refills: 0 | Status: SHIPPED | OUTPATIENT
Start: 2021-01-22 | End: 2021-04-20 | Stop reason: SDUPTHER

## 2021-01-22 RX ORDER — LISINOPRIL 5 MG/1
5 TABLET ORAL DAILY
Qty: 90 TAB | Refills: 0 | Status: SHIPPED | OUTPATIENT
Start: 2021-01-22 | End: 2021-07-06 | Stop reason: SDUPTHER

## 2021-01-22 RX ORDER — INSULIN PUMP SYRINGE, 3 ML
EACH MISCELLANEOUS
Qty: 1 KIT | Refills: 0 | Status: SHIPPED | OUTPATIENT
Start: 2021-01-22

## 2021-01-22 NOTE — PROGRESS NOTES
I called patient and discussed lab results. We will start him on Metformin, pravastatin, low-dose lisinopril, and once weekly high-dose vitamin D. I will also send in a prescription for him to get a glucometer to check blood sugars once per day. We had a long discussion about his diet and exercise, will have him come into the office for a follow-up in about 6 to 8 weeks. He will call me if he has any concerns prior to then.

## 2021-04-13 DIAGNOSIS — E55.9 VITAMIN D DEFICIENCY: ICD-10-CM

## 2021-04-13 RX ORDER — ERGOCALCIFEROL 1.25 MG/1
50000 CAPSULE ORAL
Qty: 12 CAP | Refills: 0 | Status: SHIPPED | OUTPATIENT
Start: 2021-04-13 | End: 2021-07-06 | Stop reason: SDUPTHER

## 2021-04-20 DIAGNOSIS — E78.2 MIXED HYPERLIPIDEMIA: ICD-10-CM

## 2021-04-20 RX ORDER — PRAVASTATIN SODIUM 10 MG/1
10 TABLET ORAL
Qty: 90 TAB | Refills: 0 | Status: SHIPPED | OUTPATIENT
Start: 2021-04-20

## 2021-04-20 NOTE — TELEPHONE ENCOUNTER
Reeived fax from [facility] in [facility] for refill of:    Requested Prescriptions     Pending Prescriptions Disp Refills    pravastatin (PRAVACHOL) 10 mg tablet 90 Tab 0     Sig: Take 1 Tab by mouth nightly.  Indications: high cholesterol and high triglycerides

## 2021-06-01 DIAGNOSIS — E11.9 NEW ONSET TYPE 2 DIABETES MELLITUS (HCC): ICD-10-CM

## 2021-06-01 RX ORDER — METFORMIN HYDROCHLORIDE 750 MG/1
750 TABLET, EXTENDED RELEASE ORAL 2 TIMES DAILY
Qty: 180 TABLET | Refills: 0 | Status: SHIPPED | OUTPATIENT
Start: 2021-06-01 | End: 2021-09-02

## 2021-06-01 NOTE — TELEPHONE ENCOUNTER
Received fax from [facility] in [place] for refill of:    Requested Prescriptions     Pending Prescriptions Disp Refills    metFORMIN ER (GLUCOPHAGE XR) 750 mg tablet 180 Tablet 0     Sig: Take 1 Tablet by mouth two (2) times a day.

## 2021-07-06 DIAGNOSIS — E11.9 NEW ONSET TYPE 2 DIABETES MELLITUS (HCC): ICD-10-CM

## 2021-07-06 DIAGNOSIS — E55.9 VITAMIN D DEFICIENCY: ICD-10-CM

## 2021-07-06 RX ORDER — LISINOPRIL 5 MG/1
5 TABLET ORAL DAILY
Qty: 90 TABLET | Refills: 0 | Status: SHIPPED | OUTPATIENT
Start: 2021-07-06 | End: 2021-10-04

## 2021-07-06 RX ORDER — ERGOCALCIFEROL 1.25 MG/1
50000 CAPSULE ORAL
Qty: 12 CAPSULE | Refills: 0 | Status: SHIPPED | OUTPATIENT
Start: 2021-07-06 | End: 2021-09-27

## 2021-07-06 NOTE — TELEPHONE ENCOUNTER
Requested Prescriptions     Pending Prescriptions Disp Refills    lisinopriL (PRINIVIL, ZESTRIL) 5 mg tablet 90 Tablet 0     Sig: Take 1 Tablet by mouth daily.

## 2021-07-06 NOTE — TELEPHONE ENCOUNTER
Requested Prescriptions     Pending Prescriptions Disp Refills    ergocalciferol (ERGOCALCIFEROL) 1,250 mcg (50,000 unit) capsule 12 Capsule 0     Sig: Take 1 Capsule by mouth every seven (7) days.  Indications: vitamin D deficiency (high dose therapy)

## 2021-08-16 ENCOUNTER — TELEPHONE (OUTPATIENT)
Dept: FAMILY MEDICINE CLINIC | Age: 36
End: 2021-08-16

## 2021-08-19 NOTE — TELEPHONE ENCOUNTER
LM for pt to return call. Pt has been left 3 voice messages. Will close this encounter and wait to hear back from patient.

## 2021-09-02 DIAGNOSIS — E11.9 NEW ONSET TYPE 2 DIABETES MELLITUS (HCC): ICD-10-CM

## 2021-09-02 RX ORDER — METFORMIN HYDROCHLORIDE 750 MG/1
TABLET, EXTENDED RELEASE ORAL
Qty: 180 TABLET | Refills: 0 | Status: SHIPPED | OUTPATIENT
Start: 2021-09-02

## 2021-09-27 DIAGNOSIS — E55.9 VITAMIN D DEFICIENCY: ICD-10-CM

## 2021-09-27 RX ORDER — ERGOCALCIFEROL 1.25 MG/1
CAPSULE ORAL
Qty: 12 CAPSULE | Refills: 0 | Status: SHIPPED | OUTPATIENT
Start: 2021-09-27

## 2021-10-03 DIAGNOSIS — E11.9 NEW ONSET TYPE 2 DIABETES MELLITUS (HCC): ICD-10-CM

## 2021-10-04 RX ORDER — LISINOPRIL 5 MG/1
5 TABLET ORAL DAILY
Qty: 90 TABLET | Refills: 0 | Status: SHIPPED | OUTPATIENT
Start: 2021-10-04 | End: 2022-01-03

## 2022-01-02 DIAGNOSIS — E11.9 NEW ONSET TYPE 2 DIABETES MELLITUS (HCC): ICD-10-CM

## 2022-01-03 RX ORDER — LISINOPRIL 5 MG/1
5 TABLET ORAL DAILY
Qty: 90 TABLET | Refills: 0 | Status: SHIPPED | OUTPATIENT
Start: 2022-01-03

## 2022-03-19 PROBLEM — R73.03 PREDIABETES: Status: ACTIVE | Noted: 2018-05-10

## 2022-03-19 PROBLEM — E66.01 OBESITY, MORBID (HCC): Status: ACTIVE | Noted: 2019-10-21

## 2023-05-10 RX ORDER — LANCETS 30 GAUGE
EACH MISCELLANEOUS
COMMUNITY
Start: 2021-01-22

## 2023-05-10 RX ORDER — METFORMIN HYDROCHLORIDE 750 MG/1
1 TABLET, EXTENDED RELEASE ORAL 2 TIMES DAILY
COMMUNITY
Start: 2021-09-02

## 2023-05-10 RX ORDER — PRAVASTATIN SODIUM 10 MG
TABLET ORAL
COMMUNITY
Start: 2021-04-20

## 2023-05-10 RX ORDER — LISINOPRIL 5 MG/1
1 TABLET ORAL DAILY
COMMUNITY
Start: 2022-01-03

## 2023-05-10 RX ORDER — ERGOCALCIFEROL 1.25 MG/1
CAPSULE ORAL
COMMUNITY
Start: 2021-09-27

## 2023-07-06 NOTE — TELEPHONE ENCOUNTER
Elvira is requesting a refill on med      Requested Prescriptions     Pending Prescriptions Disp Refills    ergocalciferol (ERGOCALCIFEROL) 1,250 mcg (50,000 unit) capsule 12 Cap 0     Sig: Take 1 Cap by mouth every seven (7) days.  Indications: vitamin D deficiency (high dose therapy) Unable to assess due to patient's cognitive impairment

## 2025-04-19 ENCOUNTER — APPOINTMENT (OUTPATIENT)
Facility: HOSPITAL | Age: 40
End: 2025-04-19
Payer: COMMERCIAL

## 2025-04-19 ENCOUNTER — HOSPITAL ENCOUNTER (EMERGENCY)
Facility: HOSPITAL | Age: 40
Discharge: HOME | End: 2025-04-19
Attending: EMERGENCY MEDICINE
Payer: COMMERCIAL

## 2025-04-19 VITALS
OXYGEN SATURATION: 94 % | DIASTOLIC BLOOD PRESSURE: 115 MMHG | RESPIRATION RATE: 24 BRPM | WEIGHT: 274.91 LBS | HEIGHT: 72 IN | HEART RATE: 97 BPM | BODY MASS INDEX: 37.24 KG/M2 | SYSTOLIC BLOOD PRESSURE: 168 MMHG | TEMPERATURE: 97.9 F

## 2025-04-19 DIAGNOSIS — R11.2 NAUSEA AND VOMITING, UNSPECIFIED VOMITING TYPE: ICD-10-CM

## 2025-04-19 DIAGNOSIS — R21 RASH: ICD-10-CM

## 2025-04-19 DIAGNOSIS — R06.02 SHORTNESS OF BREATH: ICD-10-CM

## 2025-04-19 DIAGNOSIS — T78.40XA ALLERGIC REACTION, INITIAL ENCOUNTER: Primary | ICD-10-CM

## 2025-04-19 LAB
ALBUMIN SERPL-MCNC: 3.5 G/DL (ref 3.5–5)
ALBUMIN/GLOB SERPL: 0.8 (ref 1.1–2.2)
ALP SERPL-CCNC: 130 U/L (ref 45–117)
ALT SERPL-CCNC: 53 U/L (ref 12–78)
ANION GAP SERPL CALC-SCNC: 4 MMOL/L (ref 2–12)
AST SERPL-CCNC: 31 U/L (ref 15–37)
BASOPHILS # BLD: 0.02 K/UL (ref 0–0.1)
BASOPHILS NFR BLD: 0.2 % (ref 0–1)
BILIRUB SERPL-MCNC: 0.8 MG/DL (ref 0.2–1)
BUN SERPL-MCNC: 18 MG/DL (ref 6–20)
BUN/CREAT SERPL: 17 (ref 12–20)
CALCIUM SERPL-MCNC: 9 MG/DL (ref 8.5–10.1)
CHLORIDE SERPL-SCNC: 102 MMOL/L (ref 97–108)
CO2 SERPL-SCNC: 28 MMOL/L (ref 21–32)
CREAT SERPL-MCNC: 1.07 MG/DL (ref 0.7–1.3)
DIFFERENTIAL METHOD BLD: ABNORMAL
EKG ATRIAL RATE: 101 BPM
EKG DIAGNOSIS: NORMAL
EKG P AXIS: 46 DEGREES
EKG P-R INTERVAL: 156 MS
EKG Q-T INTERVAL: 326 MS
EKG QRS DURATION: 76 MS
EKG QTC CALCULATION (BAZETT): 422 MS
EKG R AXIS: 60 DEGREES
EKG T AXIS: -86 DEGREES
EKG VENTRICULAR RATE: 101 BPM
EOSINOPHIL # BLD: 0.06 K/UL (ref 0–0.4)
EOSINOPHIL NFR BLD: 0.7 % (ref 0–7)
ERYTHROCYTE [DISTWIDTH] IN BLOOD BY AUTOMATED COUNT: 12 % (ref 11.5–14.5)
GLOBULIN SER CALC-MCNC: 4.3 G/DL (ref 2–4)
GLUCOSE SERPL-MCNC: 258 MG/DL (ref 65–100)
HCT VFR BLD AUTO: 53.8 % (ref 36.6–50.3)
HGB BLD-MCNC: 17.9 G/DL (ref 12.1–17)
IMM GRANULOCYTES # BLD AUTO: 0.03 K/UL (ref 0–0.04)
IMM GRANULOCYTES NFR BLD AUTO: 0.3 % (ref 0–0.5)
LYMPHOCYTES # BLD: 1.76 K/UL (ref 0.8–3.5)
LYMPHOCYTES NFR BLD: 20.3 % (ref 12–49)
MAGNESIUM SERPL-MCNC: 2.2 MG/DL (ref 1.6–2.4)
MCH RBC QN AUTO: 26.9 PG (ref 26–34)
MCHC RBC AUTO-ENTMCNC: 33.3 G/DL (ref 30–36.5)
MCV RBC AUTO: 80.8 FL (ref 80–99)
MONOCYTES # BLD: 0.78 K/UL (ref 0–1)
MONOCYTES NFR BLD: 9 % (ref 5–13)
NEUTS SEG # BLD: 6 K/UL (ref 1.8–8)
NEUTS SEG NFR BLD: 69.5 % (ref 32–75)
NRBC # BLD: 0 K/UL (ref 0–0.01)
NRBC BLD-RTO: 0 PER 100 WBC
PLATELET # BLD AUTO: 210 K/UL (ref 150–400)
PMV BLD AUTO: 12.1 FL (ref 8.9–12.9)
POTASSIUM SERPL-SCNC: 4.1 MMOL/L (ref 3.5–5.1)
PROT SERPL-MCNC: 7.8 G/DL (ref 6.4–8.2)
RBC # BLD AUTO: 6.66 M/UL (ref 4.1–5.7)
SODIUM SERPL-SCNC: 134 MMOL/L (ref 136–145)
TROPONIN I SERPL HS-MCNC: <4 NG/L (ref 0–76)
WBC # BLD AUTO: 8.7 K/UL (ref 4.1–11.1)

## 2025-04-19 PROCEDURE — 84484 ASSAY OF TROPONIN QUANT: CPT

## 2025-04-19 PROCEDURE — 80053 COMPREHEN METABOLIC PANEL: CPT

## 2025-04-19 PROCEDURE — 93005 ELECTROCARDIOGRAM TRACING: CPT | Performed by: EMERGENCY MEDICINE

## 2025-04-19 PROCEDURE — 96375 TX/PRO/DX INJ NEW DRUG ADDON: CPT

## 2025-04-19 PROCEDURE — 96374 THER/PROPH/DIAG INJ IV PUSH: CPT

## 2025-04-19 PROCEDURE — 71046 X-RAY EXAM CHEST 2 VIEWS: CPT

## 2025-04-19 PROCEDURE — 6360000002 HC RX W HCPCS: Performed by: EMERGENCY MEDICINE

## 2025-04-19 PROCEDURE — 99285 EMERGENCY DEPT VISIT HI MDM: CPT

## 2025-04-19 PROCEDURE — 83735 ASSAY OF MAGNESIUM: CPT

## 2025-04-19 PROCEDURE — 85025 COMPLETE CBC W/AUTO DIFF WBC: CPT

## 2025-04-19 RX ORDER — ONDANSETRON 4 MG/1
4 TABLET, FILM COATED ORAL 3 TIMES DAILY PRN
Qty: 15 TABLET | Refills: 0 | Status: SHIPPED | OUTPATIENT
Start: 2025-04-19

## 2025-04-19 RX ORDER — HYDROXYZINE HYDROCHLORIDE 25 MG/1
25 TABLET, FILM COATED ORAL EVERY 8 HOURS PRN
Qty: 20 TABLET | Refills: 0 | Status: SHIPPED | OUTPATIENT
Start: 2025-04-19 | End: 2025-04-29

## 2025-04-19 RX ORDER — ONDANSETRON 2 MG/ML
4 INJECTION INTRAMUSCULAR; INTRAVENOUS ONCE
Status: COMPLETED | OUTPATIENT
Start: 2025-04-19 | End: 2025-04-19

## 2025-04-19 RX ORDER — DIPHENHYDRAMINE HYDROCHLORIDE 50 MG/ML
25 INJECTION, SOLUTION INTRAMUSCULAR; INTRAVENOUS
Status: COMPLETED | OUTPATIENT
Start: 2025-04-19 | End: 2025-04-19

## 2025-04-19 RX ADMIN — DIPHENHYDRAMINE HYDROCHLORIDE 25 MG: 50 INJECTION INTRAMUSCULAR; INTRAVENOUS at 20:50

## 2025-04-19 RX ADMIN — ONDANSETRON 4 MG: 2 INJECTION, SOLUTION INTRAMUSCULAR; INTRAVENOUS at 20:50

## 2025-04-19 ASSESSMENT — PAIN SCALES - GENERAL: PAINLEVEL_OUTOF10: 0

## 2025-04-20 NOTE — ED PROVIDER NOTES
[facility] EMERGENCY DEPARTMENT  EMERGENCY DEPARTMENT ENCOUNTER       Pt Name: Yash Aguiar  MRN: [id number]  Birthdate 1985  Date of evaluation: 4/19/2025  Provider: Xavier Jain MD   PCP: No primary care provider on file.  Note Started: 10:00 PM EDT 4/19/25     CHIEF COMPLAINT       Chief Complaint   Patient presents with    Rash     Pt arrives ambulatory to tr w/ cc of generalized rash allover his body since 1730. Pt advises he was working in the yard all day but denies any chemical use today. Pt advises he ate a 'homemade easter egg\" but usure if that is what caused it. Denies any itching or burning w/ sensation. +emesis and SOB. Allergy pill PTA w/ emesis after         HISTORY OF PRESENT ILLNESS: 1 or more elements      History From: Patient, History limited by: none     Yash Aguiar is a 39 y.o. male patient with history of hypertension, here for rash, shortness of breath and vomiting.  Patient had been working outside most of the day, when he started to feel nauseated.  He developed a nonpruritic rash and started to feel short of breath.  He vomited 2 times prior to arrival.  Symptoms started at 5:30 PM.  He states he ate a \"homemade Easter egg\" at 2 PM.  Denies diarrhea, abdominal pain, chest pain.  He was belching earlier.  Denies lightheadedness or dizziness.  He took an allergy pill prior to arrival and his rash has diminished.  Denies fever or dysuria.       Please See MDM for Additional Details of the HPI/PMH  Nursing Notes were all reviewed and agreed with or any disagreements were addressed in the HPI.     REVIEW OF SYSTEMS        Positives and Pertinent negatives as per HPI.    PAST HISTORY     Past Medical History:  Past Medical History:   Diagnosis Date    Hypertension        Past Surgical History:  No past surgical history on file.    Family History:  Family History   Problem Relation Age of Onset    Hypertension Father        Social History:  Social History     Tobacco Use